# Patient Record
Sex: MALE | Race: WHITE | NOT HISPANIC OR LATINO | URBAN - METROPOLITAN AREA
[De-identification: names, ages, dates, MRNs, and addresses within clinical notes are randomized per-mention and may not be internally consistent; named-entity substitution may affect disease eponyms.]

---

## 2019-10-16 ENCOUNTER — INPATIENT (INPATIENT)
Facility: HOSPITAL | Age: 59
LOS: 4 days | Discharge: HOME | DRG: 871 | End: 2019-10-21
Attending: SURGERY | Admitting: SURGERY
Payer: COMMERCIAL

## 2019-10-16 VITALS — WEIGHT: 240.08 LBS | HEIGHT: 73 IN

## 2019-10-16 DIAGNOSIS — S22.42XA MULTIPLE FRACTURES OF RIBS, LEFT SIDE, INITIAL ENCOUNTER FOR CLOSED FRACTURE: ICD-10-CM

## 2019-10-16 LAB
ALBUMIN SERPL ELPH-MCNC: 3.9 G/DL — SIGNIFICANT CHANGE UP (ref 3.3–5)
ALBUMIN SERPL ELPH-MCNC: 4 G/DL — SIGNIFICANT CHANGE UP (ref 3.3–5)
ALP SERPL-CCNC: 65 U/L — SIGNIFICANT CHANGE UP (ref 40–120)
ALP SERPL-CCNC: 66 U/L — SIGNIFICANT CHANGE UP (ref 40–120)
ALT FLD-CCNC: 26 U/L — SIGNIFICANT CHANGE UP (ref 12–78)
ALT FLD-CCNC: 28 U/L — SIGNIFICANT CHANGE UP (ref 12–78)
ANION GAP SERPL CALC-SCNC: 10 MMOL/L — SIGNIFICANT CHANGE UP (ref 5–17)
ANION GAP SERPL CALC-SCNC: 9 MMOL/L — SIGNIFICANT CHANGE UP (ref 5–17)
APPEARANCE UR: CLEAR — SIGNIFICANT CHANGE UP
APTT BLD: 30.7 SEC — SIGNIFICANT CHANGE UP (ref 27.5–36.3)
AST SERPL-CCNC: 32 U/L — SIGNIFICANT CHANGE UP (ref 15–37)
AST SERPL-CCNC: 34 U/L — SIGNIFICANT CHANGE UP (ref 15–37)
BILIRUB DIRECT SERPL-MCNC: 0.3 MG/DL — HIGH (ref 0–0.2)
BILIRUB INDIRECT FLD-MCNC: 0.7 MG/DL — SIGNIFICANT CHANGE UP (ref 0.2–1)
BILIRUB SERPL-MCNC: 0.8 MG/DL — SIGNIFICANT CHANGE UP (ref 0.2–1.2)
BILIRUB SERPL-MCNC: 1 MG/DL — SIGNIFICANT CHANGE UP (ref 0.2–1.2)
BILIRUB UR-MCNC: NEGATIVE — SIGNIFICANT CHANGE UP
BUN SERPL-MCNC: 6 MG/DL — LOW (ref 7–23)
BUN SERPL-MCNC: 6 MG/DL — LOW (ref 7–23)
CALCIUM SERPL-MCNC: 7.9 MG/DL — LOW (ref 8.5–10.1)
CALCIUM SERPL-MCNC: 8.1 MG/DL — LOW (ref 8.5–10.1)
CHLORIDE SERPL-SCNC: 103 MMOL/L — SIGNIFICANT CHANGE UP (ref 96–108)
CHLORIDE SERPL-SCNC: 104 MMOL/L — SIGNIFICANT CHANGE UP (ref 96–108)
CO2 SERPL-SCNC: 24 MMOL/L — SIGNIFICANT CHANGE UP (ref 22–31)
CO2 SERPL-SCNC: 25 MMOL/L — SIGNIFICANT CHANGE UP (ref 22–31)
COLOR SPEC: YELLOW — SIGNIFICANT CHANGE UP
CREAT SERPL-MCNC: 0.73 MG/DL — SIGNIFICANT CHANGE UP (ref 0.5–1.3)
CREAT SERPL-MCNC: 0.8 MG/DL — SIGNIFICANT CHANGE UP (ref 0.5–1.3)
DIFF PNL FLD: NEGATIVE — SIGNIFICANT CHANGE UP
ETHANOL SERPL-MCNC: 49 MG/DL — HIGH (ref 0–10)
GLUCOSE SERPL-MCNC: 100 MG/DL — HIGH (ref 70–99)
GLUCOSE SERPL-MCNC: 102 MG/DL — HIGH (ref 70–99)
GLUCOSE UR QL: NEGATIVE MG/DL — SIGNIFICANT CHANGE UP
HCT VFR BLD CALC: 47.3 % — SIGNIFICANT CHANGE UP (ref 39–50)
HGB BLD-MCNC: 16.7 G/DL — SIGNIFICANT CHANGE UP (ref 13–17)
INR BLD: 0.97 RATIO — SIGNIFICANT CHANGE UP (ref 0.88–1.16)
KETONES UR-MCNC: ABNORMAL
LEUKOCYTE ESTERASE UR-ACNC: NEGATIVE — SIGNIFICANT CHANGE UP
LIDOCAIN IGE QN: 101 U/L — SIGNIFICANT CHANGE UP (ref 73–393)
MAGNESIUM SERPL-MCNC: 2.1 MG/DL — SIGNIFICANT CHANGE UP (ref 1.6–2.6)
MCHC RBC-ENTMCNC: 34.5 PG — HIGH (ref 27–34)
MCHC RBC-ENTMCNC: 35.3 GM/DL — SIGNIFICANT CHANGE UP (ref 32–36)
MCV RBC AUTO: 97.7 FL — SIGNIFICANT CHANGE UP (ref 80–100)
NITRITE UR-MCNC: NEGATIVE — SIGNIFICANT CHANGE UP
NT-PROBNP SERPL-SCNC: 20 PG/ML — SIGNIFICANT CHANGE UP (ref 0–125)
PH UR: 5 — SIGNIFICANT CHANGE UP (ref 5–8)
PHOSPHATE SERPL-MCNC: 3.1 MG/DL — SIGNIFICANT CHANGE UP (ref 2.5–4.5)
PLATELET # BLD AUTO: 142 K/UL — LOW (ref 150–400)
POTASSIUM SERPL-MCNC: 4 MMOL/L — SIGNIFICANT CHANGE UP (ref 3.5–5.3)
POTASSIUM SERPL-MCNC: 4 MMOL/L — SIGNIFICANT CHANGE UP (ref 3.5–5.3)
POTASSIUM SERPL-SCNC: 4 MMOL/L — SIGNIFICANT CHANGE UP (ref 3.5–5.3)
POTASSIUM SERPL-SCNC: 4 MMOL/L — SIGNIFICANT CHANGE UP (ref 3.5–5.3)
PROT SERPL-MCNC: 7.2 GM/DL — SIGNIFICANT CHANGE UP (ref 6–8.3)
PROT SERPL-MCNC: 7.3 GM/DL — SIGNIFICANT CHANGE UP (ref 6–8.3)
PROT UR-MCNC: NEGATIVE MG/DL — SIGNIFICANT CHANGE UP
PROTHROM AB SERPL-ACNC: 10.8 SEC — SIGNIFICANT CHANGE UP (ref 10–12.9)
RBC # BLD: 4.84 M/UL — SIGNIFICANT CHANGE UP (ref 4.2–5.8)
RBC # FLD: 12.8 % — SIGNIFICANT CHANGE UP (ref 10.3–14.5)
SODIUM SERPL-SCNC: 137 MMOL/L — SIGNIFICANT CHANGE UP (ref 135–145)
SODIUM SERPL-SCNC: 138 MMOL/L — SIGNIFICANT CHANGE UP (ref 135–145)
SP GR SPEC: 1.01 — SIGNIFICANT CHANGE UP (ref 1.01–1.02)
TROPONIN I SERPL-MCNC: <0.015 NG/ML — SIGNIFICANT CHANGE UP (ref 0.01–0.04)
UROBILINOGEN FLD QL: 1 MG/DL
WBC # BLD: 11.23 K/UL — HIGH (ref 3.8–10.5)
WBC # FLD AUTO: 11.23 K/UL — HIGH (ref 3.8–10.5)

## 2019-10-16 PROCEDURE — 83735 ASSAY OF MAGNESIUM: CPT

## 2019-10-16 PROCEDURE — 85025 COMPLETE CBC W/AUTO DIFF WBC: CPT

## 2019-10-16 PROCEDURE — 87070 CULTURE OTHR SPECIMN AEROBIC: CPT

## 2019-10-16 PROCEDURE — 71260 CT THORAX DX C+: CPT | Mod: 26

## 2019-10-16 PROCEDURE — 97162 PT EVAL MOD COMPLEX 30 MIN: CPT | Mod: GP

## 2019-10-16 PROCEDURE — 80076 HEPATIC FUNCTION PANEL: CPT

## 2019-10-16 PROCEDURE — 99232 SBSQ HOSP IP/OBS MODERATE 35: CPT

## 2019-10-16 PROCEDURE — 74177 CT ABD & PELVIS W/CONTRAST: CPT | Mod: 26

## 2019-10-16 PROCEDURE — 87581 M.PNEUMON DNA AMP PROBE: CPT

## 2019-10-16 PROCEDURE — C9113: CPT

## 2019-10-16 PROCEDURE — 99223 1ST HOSP IP/OBS HIGH 75: CPT

## 2019-10-16 PROCEDURE — 93010 ELECTROCARDIOGRAM REPORT: CPT

## 2019-10-16 PROCEDURE — 97110 THERAPEUTIC EXERCISES: CPT | Mod: GP

## 2019-10-16 PROCEDURE — 80048 BASIC METABOLIC PNL TOTAL CA: CPT

## 2019-10-16 PROCEDURE — 84484 ASSAY OF TROPONIN QUANT: CPT

## 2019-10-16 PROCEDURE — 87798 DETECT AGENT NOS DNA AMP: CPT

## 2019-10-16 PROCEDURE — 87040 BLOOD CULTURE FOR BACTERIA: CPT

## 2019-10-16 PROCEDURE — 81003 URINALYSIS AUTO W/O SCOPE: CPT

## 2019-10-16 PROCEDURE — 71046 X-RAY EXAM CHEST 2 VIEWS: CPT | Mod: 26

## 2019-10-16 PROCEDURE — 36415 COLL VENOUS BLD VENIPUNCTURE: CPT

## 2019-10-16 PROCEDURE — 71045 X-RAY EXAM CHEST 1 VIEW: CPT | Mod: 26,59

## 2019-10-16 PROCEDURE — 87486 CHLMYD PNEUM DNA AMP PROBE: CPT

## 2019-10-16 PROCEDURE — 86803 HEPATITIS C AB TEST: CPT

## 2019-10-16 PROCEDURE — 97116 GAIT TRAINING THERAPY: CPT | Mod: GP

## 2019-10-16 PROCEDURE — 71250 CT THORAX DX C-: CPT

## 2019-10-16 PROCEDURE — 85027 COMPLETE CBC AUTOMATED: CPT

## 2019-10-16 PROCEDURE — 71045 X-RAY EXAM CHEST 1 VIEW: CPT

## 2019-10-16 PROCEDURE — 84100 ASSAY OF PHOSPHORUS: CPT

## 2019-10-16 PROCEDURE — 70450 CT HEAD/BRAIN W/O DYE: CPT | Mod: 26

## 2019-10-16 PROCEDURE — 87633 RESP VIRUS 12-25 TARGETS: CPT

## 2019-10-16 PROCEDURE — 72125 CT NECK SPINE W/O DYE: CPT | Mod: 26

## 2019-10-16 RX ORDER — ALBUTEROL 90 UG/1
2 AEROSOL, METERED ORAL
Qty: 0 | Refills: 0 | DISCHARGE

## 2019-10-16 RX ORDER — ACETAMINOPHEN 500 MG
650 TABLET ORAL EVERY 6 HOURS
Refills: 0 | Status: DISCONTINUED | OUTPATIENT
Start: 2019-10-16 | End: 2019-10-21

## 2019-10-16 RX ORDER — SODIUM CHLORIDE 9 MG/ML
1000 INJECTION INTRAMUSCULAR; INTRAVENOUS; SUBCUTANEOUS ONCE
Refills: 0 | Status: COMPLETED | OUTPATIENT
Start: 2019-10-16 | End: 2019-10-16

## 2019-10-16 RX ORDER — PANTOPRAZOLE SODIUM 20 MG/1
40 TABLET, DELAYED RELEASE ORAL DAILY
Refills: 0 | Status: DISCONTINUED | OUTPATIENT
Start: 2019-10-16 | End: 2019-10-18

## 2019-10-16 RX ORDER — HYDROMORPHONE HYDROCHLORIDE 2 MG/ML
1 INJECTION INTRAMUSCULAR; INTRAVENOUS; SUBCUTANEOUS EVERY 4 HOURS
Refills: 0 | Status: DISCONTINUED | OUTPATIENT
Start: 2019-10-16 | End: 2019-10-21

## 2019-10-16 RX ORDER — HYDROMORPHONE HYDROCHLORIDE 2 MG/ML
0.5 INJECTION INTRAMUSCULAR; INTRAVENOUS; SUBCUTANEOUS EVERY 4 HOURS
Refills: 0 | Status: DISCONTINUED | OUTPATIENT
Start: 2019-10-16 | End: 2019-10-21

## 2019-10-16 RX ORDER — HEPARIN SODIUM 5000 [USP'U]/ML
5000 INJECTION INTRAVENOUS; SUBCUTANEOUS EVERY 8 HOURS
Refills: 0 | Status: DISCONTINUED | OUTPATIENT
Start: 2019-10-16 | End: 2019-10-21

## 2019-10-16 RX ORDER — NICOTINE POLACRILEX 2 MG
1 GUM BUCCAL DAILY
Refills: 0 | Status: DISCONTINUED | OUTPATIENT
Start: 2019-10-16 | End: 2019-10-21

## 2019-10-16 RX ORDER — ONDANSETRON 8 MG/1
4 TABLET, FILM COATED ORAL EVERY 6 HOURS
Refills: 0 | Status: DISCONTINUED | OUTPATIENT
Start: 2019-10-16 | End: 2019-10-21

## 2019-10-16 RX ORDER — KETOROLAC TROMETHAMINE 30 MG/ML
30 SYRINGE (ML) INJECTION ONCE
Refills: 0 | Status: DISCONTINUED | OUTPATIENT
Start: 2019-10-16 | End: 2019-10-16

## 2019-10-16 RX ORDER — LIDOCAINE 4 G/100G
2 CREAM TOPICAL DAILY
Refills: 0 | Status: DISCONTINUED | OUTPATIENT
Start: 2019-10-16 | End: 2019-10-21

## 2019-10-16 RX ORDER — SODIUM CHLORIDE 9 MG/ML
1000 INJECTION, SOLUTION INTRAVENOUS
Refills: 0 | Status: DISCONTINUED | OUTPATIENT
Start: 2019-10-16 | End: 2019-10-21

## 2019-10-16 RX ORDER — MORPHINE SULFATE 50 MG/1
4 CAPSULE, EXTENDED RELEASE ORAL ONCE
Refills: 0 | Status: DISCONTINUED | OUTPATIENT
Start: 2019-10-16 | End: 2019-10-16

## 2019-10-16 RX ADMIN — SODIUM CHLORIDE 1000 MILLILITER(S): 9 INJECTION INTRAMUSCULAR; INTRAVENOUS; SUBCUTANEOUS at 11:24

## 2019-10-16 RX ADMIN — MORPHINE SULFATE 4 MILLIGRAM(S): 50 CAPSULE, EXTENDED RELEASE ORAL at 12:11

## 2019-10-16 RX ADMIN — HYDROMORPHONE HYDROCHLORIDE 0.5 MILLIGRAM(S): 2 INJECTION INTRAMUSCULAR; INTRAVENOUS; SUBCUTANEOUS at 20:13

## 2019-10-16 RX ADMIN — HYDROMORPHONE HYDROCHLORIDE 1 MILLIGRAM(S): 2 INJECTION INTRAMUSCULAR; INTRAVENOUS; SUBCUTANEOUS at 17:05

## 2019-10-16 RX ADMIN — Medication 1 PATCH: at 14:44

## 2019-10-16 RX ADMIN — Medication 1 PATCH: at 20:17

## 2019-10-16 RX ADMIN — MORPHINE SULFATE 4 MILLIGRAM(S): 50 CAPSULE, EXTENDED RELEASE ORAL at 12:35

## 2019-10-16 RX ADMIN — SODIUM CHLORIDE 125 MILLILITER(S): 9 INJECTION, SOLUTION INTRAVENOUS at 14:30

## 2019-10-16 RX ADMIN — HYDROMORPHONE HYDROCHLORIDE 0.5 MILLIGRAM(S): 2 INJECTION INTRAMUSCULAR; INTRAVENOUS; SUBCUTANEOUS at 21:00

## 2019-10-16 RX ADMIN — HEPARIN SODIUM 5000 UNIT(S): 5000 INJECTION INTRAVENOUS; SUBCUTANEOUS at 17:05

## 2019-10-16 NOTE — CONSULT NOTE ADULT - SUBJECTIVE AND OBJECTIVE BOX
58 yo M with pmh ETOH abuse, prostate CA, scoliosis presents s/p fall down 6 steps sustaining L 1-2 rib fractures, small L pneumothorax and pneumomediastinum. Hospitalist consulted for medical management      Past Medical, Past Surgical, and Family History:  PAST MEDICAL HISTORY:  Prostate cancer     Scoliosis.     No significant past surgical history.     No pertinent family history in first degree relatives.     No Pertinent Family History in first degree relatives of: none pertinent per pt.     Social History:  Social History (marital status, living situation, occupation, tobacco use, alcohol and drug use, and sexual history): 1 ppd tobacco, pt states 3 cocktails of vodka daily, denied illicit drug use	     Tobacco Screening:  · Core Measure Site	Yes	  · Has the patient used tobacco in the past 30 days?	Yes	  · Tobacco Cessation Education/Counseling	Offered and patient declined	  · Tobacco Cessation Medication	Offered and patient accepted	            REVIEW OF SYSTEMS:    CONSTITUTIONAL: No weakness, fevers or chills  EYES/ENT: No visual changes;  No vertigo or throat pain   NECK: No pain or stiffness  RESPIRATORY: No cough, wheezing, hemoptysis; No shortness of breath  CARDIOVASCULAR: No chest pain or palpitations. _ left chest wall pain  GASTROINTESTINAL: No abdominal or epigastric pain. No nausea, vomiting, or hematemesis; No diarrhea or constipation. No melena or hematochezia.  GENITOURINARY: No dysuria, frequency or hematuria  NEUROLOGICAL: No numbness or weakness  SKIN: No itching, burning, rashes, or lesions   All other review of systems is negative unless indicated above    ICU Vital Signs Last 24 Hrs  T(C): 37.1 (16 Oct 2019 19:02), Max: 37.6 (16 Oct 2019 12:10)  T(F): 98.8 (16 Oct 2019 19:02), Max: 99.7 (16 Oct 2019 16:44)  HR: 79 (16 Oct 2019 19:02) (78 - 94)  BP: 158/75 (16 Oct 2019 19:02) (117/81 - 164/88)  BP(mean): 94 (16 Oct 2019 19:02) (94 - 94)  ABP: --  ABP(mean): --  RR: 24 (16 Oct 2019 19:02) (18 - 28)  SpO2: 92% (16 Oct 2019 19:02) (91% - 100%)        PHYSICAL EXAM:    Constitutional: NAD, awake and alert, well-developed  HEENT: PERR, EOMI, Normal Hearing, MMM  Neck: Soft and supple, No LAD, No JVD  Respiratory: Breath sounds are clear bilaterally, No wheezing, rales or rhonchi  Cardiovascular: S1 and S2, regular rate and rhythm, no Murmurs, gallops or rubs  Gastrointestinal: Bowel Sounds present, soft, nontender, nondistended, no guarding, no rebound  Extremities: No peripheral edema  Vascular: 2+ peripheral pulses  Neurological: A/O x 3, no focal deficits  Musculoskeletal: 5/5 strength b/l upper and lower extremities  Skin: No rashes 58 yo M with pmh ETOH abuse, prostate CA, scoliosis presents s/p fall down 6 steps sustaining L 1-2 rib fractures, small L pneumothorax and pneumomediastinum.   Hospitalist consulted for medical management.   Pt appears uncomfortable during exam endorsing pain to left anterior chest wall worse on inspiration. No abd pain. No neck pain.   No hematochezia or melena. No hematuria.       Past Medical, Past Surgical, and Family History:  PAST MEDICAL HISTORY:  Prostate cancer     Scoliosis.     Shx: left foot surgery after complete avulsion      fhx: mother early CAD,  at 92; Father: copd     Social History:  Social History (marital status, living situation, occupation, tobacco use, alcohol and drug use, and sexual history): 1 ppd tobacco x 30 years, pt states 3 cocktails of vodka weekly, denied illicit drug use	     Tobacco Screening:  · Core Measure Site	Yes	  · Has the patient used tobacco in the past 30 days?	Yes	  · Tobacco Cessation Education/Counseling	Offered and patient declined	  · Tobacco Cessation Medication	Offered and patient accepted	            REVIEW OF SYSTEMS:    CONSTITUTIONAL: No weakness, fevers or chills  EYES/ENT: No visual changes;  No vertigo or throat pain   NECK: No pain or stiffness  RESPIRATORY: No cough, wheezing, hemoptysis; No shortness of breath  CARDIOVASCULAR: No chest pain or palpitations. + left chest wall pain  GASTROINTESTINAL: No abdominal or epigastric pain. No nausea, vomiting, or hematemesis; No diarrhea or constipation. No melena or hematochezia.  GENITOURINARY: No dysuria, frequency or hematuria  NEUROLOGICAL: No numbness or weakness  SKIN: No itching, burning, rashes, or lesions   All other review of systems is negative unless indicated above    ICU Vital Signs Last 24 Hrs  T(C): 37.1 (16 Oct 2019 19:02), Max: 37.6 (16 Oct 2019 12:10)  T(F): 98.8 (16 Oct 2019 19:02), Max: 99.7 (16 Oct 2019 16:44)  HR: 79 (16 Oct 2019 19:02) (78 - 94)  BP: 158/75 (16 Oct 2019 19:02) (117/81 - 164/88)  BP(mean): 94 (16 Oct 2019 19:02) (94 - 94)  ABP: --  ABP(mean): --  RR: 24 (16 Oct 2019 19:02) (18 - 28)  SpO2: 92% (16 Oct 2019 19:02) (91% - 100%)        PHYSICAL EXAM:    Constitutional: NAD, awake and alert, well-developed  HEENT: PERR, EOMI, Normal Hearing, MMM  Neck: Soft and supple, No LAD, No JVD  Respiratory: Breath sounds are clear bilaterally, No wheezing, rales or rhonchi  Cardiovascular: S1 and S2, regular rate and rhythm, no Murmurs, gallops or rubs  Gastrointestinal: Bowel Sounds present, soft, nontender, nondistended, no guarding, no rebound  Extremities: No peripheral edema  Vascular: 2+ peripheral pulses  Neurological: A/O x 3, no focal deficits  Musculoskeletal: 5/5 strength b/l upper and lower extremities  Skin: No rashes

## 2019-10-16 NOTE — ED STATDOCS - CARE PLAN
Principal Discharge DX:	Closed fracture of multiple ribs of left side, initial encounter  Secondary Diagnosis:	Pneumomediastinum  Secondary Diagnosis:	Subcutaneous air, initial encounter

## 2019-10-16 NOTE — ED STATDOCS - CLINICAL SUMMARY MEDICAL DECISION MAKING FREE TEXT BOX
Pt with CP s/p fall down stairs. +EtOH. Concern for traumatic injury, ACS. EKG unchanged vs 2015. Plan for labs, imaging.

## 2019-10-16 NOTE — CONSULT NOTE ADULT - ASSESSMENT
59M with h/o smoking, ETOH abuse, s/p fall down 5 stairs last night with new left 1-2 rib fractures, pneumothorax and pneumomediastium.    *  Repeat CXR in 2 hours  *  HOB 30  *  Aspiration precautions  *  CIWA protocol  *  Lidoderm for pain control

## 2019-10-16 NOTE — ED STATDOCS - PROGRESS NOTE DETAILS
called by radiologist with pt's CT results, pt with L anterior 1st and 2nd rib fxs, small L pneumothorax, marked pneumomediastinum and subcutaneous air in the L chest wall, called and spoke with trauma surgeon Dr. Aldridge, will come see pt, also requested we call thoracic surgery, paged, awaiting callback, pt sent to main ED trauma room  Flora Pham PA-C spoke with thoracic PA, will come see pt  Flora Pham PA-C pt seen and examined by Dr. Emanuel, will admit pt to him for further management  Flora Pham PA-C

## 2019-10-16 NOTE — ED ADULT NURSE NOTE - NSIMPLEMENTINTERV_GEN_ALL_ED
Implemented All Universal Safety Interventions:  [place] to call system. Call bell, personal items and telephone within reach. Instruct patient to call for assistance. Room bathroom lighting operational. Non-slip footwear when patient is off stretcher. Physically safe environment: no spills, clutter or unnecessary equipment. Stretcher in lowest position, wheels locked, appropriate side rails in place.

## 2019-10-16 NOTE — CONSULT NOTE ADULT - SUBJECTIVE AND OBJECTIVE BOX
History of Present Illness:  59y Male who presented this morning with pain s/p fall 12 hours ago. +ETOH, fall down 5 steps, denies LOC.  CT found to have left 1-2 rib fractures with small left pneumothorax, pneumomediastinum.  Denies nausea, vomiting.    PMH/PSH:  Prostate cancer  Scoliosis        SOCIAL HISTORY:  Smoker: [X] Yes  [ ] No        PACK YEARS:                         WHEN QUIT?  ETOH use: [X] Yes  [ ] No              FREQUENCY / QUANTITY:  [place] Drug use:  [ ] Yes  [X] No  Occupation:    Live with:  Boyfriend/partner  Assist device use: None    MEDICATIONS  (STANDING):  heparin  Injectable 5000 Unit(s) SubCutaneous every 8 hours  nicotine -  14 mG/24Hr(s) Patch 1 patch Transdermal daily  pantoprazole  Injectable 40 milliGRAM(s) IV Push daily  sodium chloride 0.9% 1000 milliLiter(s) (125 mL/Hr) IV Continuous <Continuous>    MEDICATIONS  (PRN):  acetaminophen   Tablet .. 650 milliGRAM(s) Oral every 6 hours PRN Temp greater or equal to 38C (100.4F), Mild Pain (1 - 3)  HYDROmorphone  Injectable 0.5 milliGRAM(s) IV Push every 4 hours PRN Moderate Pain (4 - 6)  HYDROmorphone  Injectable 1 milliGRAM(s) IV Push every 4 hours PRN Severe Pain (7 - 10)  LORazepam   Injectable 2 milliGRAM(s) IV Push every 2 hours PRN CIWA-Ar score increase by 2 points and a total score of 7 or less  ondansetron Injectable 4 milliGRAM(s) IV Push every 6 hours PRN Nausea      Allergies: No Known Allergies                                                            LABS:                        16.7   11.23 )-----------( 142      ( 16 Oct 2019 11:17 )             47.3     10-16    137  |  103  |  6<L>  ----------------------------<  100<H>  4.0   |  24  |  0.80    Ca    8.1<L>      16 Oct 2019 11:17    TPro  7.3  /  Alb  4.0  /  TBili  0.8  /  DBili  x   /  AST  34  /  ALT  28  /  AlkPhos  66  10-16    PT/INR - ( 16 Oct 2019 11:17 )   PT: 10.8 sec;   INR: 0.97 ratio         PTT - ( 16 Oct 2019 11:17 )  PTT:30.7 sec      Review of Systems       Constitutional: denies fever, chills, general malaise, weight loss, weight gain, diaphoresis   HEENT: denies dry mouth, sore throat, runny nose, photophobia, blurry vision, double vision, glasses, discharge, eye pain, difficulty hearing, vertigo, dysphagia, epistaxis, recent dental work    Respiratory: denies SOB, PERKINS, cough, phlegm, wheezing, hemoptysis  Cardiovascular: denies CP, palpitations, edema, diaphoresis   Gastrointestinal: denies nausea, vomiting, diarrhea, constipation, abdominal pain, melena   Genitourinary: denies dysuria, frequency, urgency, incontinence, hematuria   Skin/Breast: denies rash, hives, itching, masses, hair loss   Musculoskeletal: denies myalgias, arthritis, joint swelling, muscle weakness  Neurologic: denies syncope, LOC, headache, weakness, dizziness, parasthesias, numbness, seizures, confusion, dementia   Psychiatric: denies feeling anxious, depressed, suicidal, homicidal  Endocrine: denies increased fingerstick glucoses, cold or heat intolerance, polydipsia, polyuria, polyphagia   Hematology/Oncology: denies bruising, tender or enlarged lymph nodes   ROS negative x 10 systems except as noted above    T(C): 37.6   HR: 86  BP: 149/79   RR: 20   SpO2: 91% on room air    Physical Exam  General: WD, WN, NAD                                                         Neuro: A+O x 3, non-focal, speech clear and intact                     Eyes: PERRL, EOMI   ENT: Normal exam of nasal/oral mucosa with absence of cyanosis.   Neck: supple, no JVD, trachea midline   Chest: CTA, equal bilaterally, no wheezes/rales/rhonchi, normal excursion, no accessory muscle use note; left chest crepitus anteriorly with TTP over the left anterior chest  CV: RRR, +S1S2  GI: soft, NT, ND, +BS, no organomegaly noted  Extremities: THRASHER x 4, no C/C/E, distal motor/neuro/circ intact  SKIN: warm, dry, intact

## 2019-10-16 NOTE — CONSULT NOTE ADULT - ASSESSMENT
60 yo M with pmh ETOH abuse, prostate CA, scoliosis presents s/p fall down 6 steps sustaining L 1-2 rib fractures, small L pneumothorax and pneumomediastinum. Hospitalist consulted for medical management    #L multiple rib fractures after mechanical fall  #L PTX - post traumatic  #Pneumomediastinum - post traumatic  #ETOH use 58 yo M with pmh ETOH abuse, prostate CA, scoliosis presents s/p fall down 6 steps sustaining L 1-2 rib fractures, small L pneumothorax and pneumomediastinum. Hospitalist consulted for medical management    #L multiple rib fractures after mechanical fall  #L PTX - post traumatic  #Pneumomediastinum - post traumatic  #ETOH use  - Pain control  - Incentive spiromater  - Continuous pulse ox  - Serial CXR for evolution of PTX  - No e/o withdrawal  - Cont CIWA protocol  - EKG noted, no dynamic changes    DVT px

## 2019-10-16 NOTE — ED ADULT NURSE REASSESSMENT NOTE - NS ED NURSE REASSESS COMMENT FT1
Dr. Emanuel at the bedside evaluating patient. Pt sating 91% RA, pt placed on 2L NC , now sating 94%. Awaiting for thoracic consult. Will continue to monitor for safety and comfort.

## 2019-10-16 NOTE — ED STATDOCS - MUSCULOSKELETAL, MLM
range of motion is not limited. No midline spinal tenderness. No midline tenderness to Cspine. No palpable step off. Tenderness to anterior chest. No ecchymosis.

## 2019-10-16 NOTE — CONSULT NOTE ADULT - ATTENDING COMMENTS
Patient seen and examined. CT chest reviewed. I was clearly able to see the second rib fx, but the first rib fx was difficult to see. The major vessels seem intact. Usually it takes a great deal of impact to break the first two ribs. He does have extensive subcutaneous air. He denies any difficulty breathing, he is able to swallow spit without any problems. His voice does sounds a bit nasally due to the subq air.     The rib fractures will heal conservatively. Will monitor for delayed or subsequent pleural effusion, hemothorax, pneumothorax. We will also make sure that the pneumomediastinum is improving.     Thank You for the consult, will continue to follow along with you.

## 2019-10-16 NOTE — ED ADULT NURSE REASSESSMENT NOTE - NS ED NURSE REASSESS COMMENT FT1
Eliza MILLER  from Thoracic Surgery at the bedside evaluating patient at this time, will continue to monitor for safety and comfort. Pt explained the CT results, free air noted im the left chest wall and broken ribs. Plan is to admit for observation, repeat x-rays to watch for increase in free air in chest cavity.

## 2019-10-16 NOTE — H&P ADULT - HISTORY OF PRESENT ILLNESS
Trauma Consult, 10/06/19, notified 1146am, responded bedside 1200pm    Pt s/p fall down 6 steps in evening 10/15/19, pt denied head trauma or LOC. Pt stated going to sleep after the fall, with c/o left rib/chest pain; pt woke up this am, 10/16/19, c/o left rib/chest pain and presented to ER for evaluation    Pt seen and examined at bedside with chaperone. Pt is AAOx3, pt in no acute distress. Pt denied c/o fever, chills, SOB, abd pain, N/V/D, extremity pain or dysfunction, hemoptysis, hematemesis, hematuria, hematochexia, headache, diplopia, vertigo, dizzyness.

## 2019-10-16 NOTE — H&P ADULT - ASSESSMENT
A/P:  Left 1-2nd rib fractures  Large left subcutaneous emphysema  Pneumomediastinum  S/P fall down 6 steps 10/15/19  Thoracic surgery on consult, management and further workup per thoracic surgery  CIWA protocol  NPO, IV hydration  GI/DVT prophylaxis  Pain control  Incentive spirometry  F/U labs, radiologic studies  Hospitalist consult for medical management   consult for etoh  Physical therapy  Pt will be monitored for signs of evolution/resolution of pathology and surgical intervention as required and warranted  Pt aware of and agrees with all of the above

## 2019-10-16 NOTE — ED ADULT NURSE NOTE - OBJECTIVE STATEMENT
pt is a 58 y/o male smoker greater than 20 years presenting to ED for eval of chest pain, anterior chest since this morning w/ associated productive cough. denies fever chills palpitations nausea vomiting or diarrhea. pt denies recent travel.

## 2019-10-16 NOTE — H&P ADULT - NSHPPHYSICALEXAM_GEN_ALL_CORE
GCS of 15  Airway is patent  Breathing is symmetric and unlabored  Neuro: CNII-XII grossly intact  Psych: normal affect  HEENT: Normocephalic, atraumatic, RODGER, EOM wnl, no otorrhea or hemotympanum b/l, no epistaxis or d/c b/l nares, no craniofacial bony pathology or tenderness b/l  Neck: soft nad supple, non tender to passive/active ROM exam. (+) crepitus, no ecchymosis, no hematoma, to exam, no JVD, no tracheal deviation  Cspine/thoracolumbrosacral spine: no gross bony pathology or tenderness to exam  Cardiovascular: S1S2 Present  Chest: no gross right rib pathology or tenderness to exam. No sternal pathology or tenderness to exam. (+) tenderness to left upper thorax posteriorly and anteriorly. (+) crepitus to left chest wall region, no ecchymosis, no hematoma. No penetrating thorcoabdominal trauma  Respiratory: Rate is 18; Respiratory Effort normal; no wheezes, rales or rhonchi to exam  ABD: bowel sounds (+), soft, nontender, non distended, no rebound, no guarding, no rigidity, no skin changes to exam. No pelvic instability to exam, no skin changes  Genitourinary: No scrotal/perineal/perirectal hematoma/ecchymosis/tenderness to exam  External genitalia: normal, no blood at urethral meatus  Musculoskeletal: Pt has palpable b/l radial, femoral, dorsalis pedis pulses. All digits are warm and well perfused. No gross long bone pathology or tenderness to exam. Pt demonstrates grossly intact sensoromotor function. Pt has good capillary refill to digits, no calf edema or tenderness to exam.  Skin: no lesions or rashes to exam    Vital Signs Last 24 Hrs, see trauma flow sheet  T(C): 37.6 (16 Oct 2019 12:10), Max: 37.6 (16 Oct 2019 12:10)  T(F): 99.6 (16 Oct 2019 12:10), Max: 99.6 (16 Oct 2019 12:10)  HR: 86 (16 Oct 2019 12:10) (86 - 94)  BP: 149/79 (16 Oct 2019 12:10) (133/72 - 149/79)  BP(mean): --  RR: 20 (16 Oct 2019 12:10) (18 - 20)  SpO2: 91% (16 Oct 2019 12:10) (91% - 100%)

## 2019-10-16 NOTE — ED ADULT NURSE NOTE - CHPI ED NUR SYMPTOMS NEG
no chills/no dizziness/no congestion/no diaphoresis/no syncope/no fever/no nausea/no shortness of breath/no vomiting

## 2019-10-16 NOTE — H&P ADULT - NSHPLABSRESULTS_GEN_ALL_CORE
Labs:  CARDIAC MARKERS ( 16 Oct 2019 11:17 )  <0.015 ng/mL / x     / x     / x     / x                            16.7   11.23 )-----------( 142      ( 16 Oct 2019 11:17 )             47.3   CBC Full  -  ( 16 Oct 2019 11:17 )  WBC Count : 11.23 K/uL  RBC Count : 4.84 M/uL  Hemoglobin : 16.7 g/dL  Hematocrit : 47.3 %  Platelet Count - Automated : 142 K/uL  Mean Cell Volume : 97.7 fl  Mean Cell Hemoglobin : 34.5 pg  Mean Cell Hemoglobin Concentration : 35.3 gm/dL  Auto Neutrophil # : x  Auto Lymphocyte # : x  Auto Monocyte # : x  Auto Eosinophil # : x  Auto Basophil # : x  Auto Neutrophil % : x  Auto Lymphocyte % : x  Auto Monocyte % : x  Auto Eosinophil % : x  Auto Basophil % : x  137  |  103  |  6<L>  ----------------------------<  100<H>  4.0   |  24  |  0.80  Ca    8.1<L>      16 Oct 2019 11:17  TPro  7.3  /  Alb  4.0  /  TBili  0.8  /  DBili  x   /  AST  34  /  ALT  28  /  AlkPhos  66  10-16  LIVER FUNCTIONS - ( 16 Oct 2019 11:17 )  Alb: 4.0 g/dL / Pro: 7.3 gm/dL / ALK PHOS: 66 U/L / ALT: 28 U/L / AST: 34 U/L / GGT: x         PT/INR - ( 16 Oct 2019 11:17 )   PT: 10.8 sec;   INR: 0.97 ratio    PTT - ( 16 Oct 2019 11:17 )  PTT:30.7 sec    Radiology:    EXAM:  CT ABDOMEN AND PELVIS IC                        EXAM:  CT CHEST IC                        PROCEDURE DATE:  10/16/2019    INTERPRETATION:  CLINICAL INFORMATION: Chest pain, status post fall down   stairs  COMPARISON: None.  IMPRESSION:   Fractures of the left anterior first and second ribs.  Small left pneumothorax  Marked pneumomediastinum  Marked neck and left chest wall subcutaneous emphysema.    EXAM:  CT CERVICAL SPINE                        PROCEDURE DATE:  10/16/2019    INTERPRETATION:  Exam Date: 10/16/2019 11:05 AM  CT cervical spine without IV contrast  CLINICAL INFORMATION: pain s/p fall  IMPRESSION:     Very large degree of emphysema throughout the deep soft tissues of the   neck and upper mediastinum. Please see report of CT chest for further   thoracic findings.    No vertebral fracture is recognized.  Multilevel degenerative changes of   the cervical spine are present.    EXAM:  CT BRAIN                        PROCEDURE DATE:  10/16/2019    INTERPRETATION:  Exam Date: 10/16/2019 10:59 AM  IMPRESSION:     No acute intracranial findings.  There is a chronic lacunar infarct in   the left anterior basal ganglia.

## 2019-10-16 NOTE — ED STATDOCS - OBJECTIVE STATEMENT
60 y/o male with a PMHx of prostate cancer, scoliosis presents to the ED c/o chest tightness starting this morning. Nonradiating. Pt notes he was wearing socks last night and fell down 5 steps. No LOC. Pt landed on his back and is now c/o back pain. Denies dysuria, hematuria, abd pain. Family cardiac hx. Smoker (1ppd). Last EtOH: 12am last night. NKDA. No recent travel. Does not have a PCP. No other complaints at this time.

## 2019-10-16 NOTE — ED STATDOCS - NS_ ATTENDINGSCRIBEDETAILS _ED_A_ED_FT
Mukesh Matias DO (Attending): The history, relevant review of systems, past medical and surgical history, medical decision making, and physical examination was documented by the scribe in my presence and I attest to the accuracy of the documentation.

## 2019-10-17 LAB
BASOPHILS # BLD AUTO: 0.04 K/UL — SIGNIFICANT CHANGE UP (ref 0–0.2)
BASOPHILS NFR BLD AUTO: 0.4 % — SIGNIFICANT CHANGE UP (ref 0–2)
EOSINOPHIL # BLD AUTO: 0.01 K/UL — SIGNIFICANT CHANGE UP (ref 0–0.5)
EOSINOPHIL NFR BLD AUTO: 0.1 % — SIGNIFICANT CHANGE UP (ref 0–6)
GRAM STN FLD: SIGNIFICANT CHANGE UP
HCT VFR BLD CALC: 47.1 % — SIGNIFICANT CHANGE UP (ref 39–50)
HCV AB S/CO SERPL IA: 0.16 S/CO — SIGNIFICANT CHANGE UP (ref 0–0.99)
HCV AB SERPL-IMP: SIGNIFICANT CHANGE UP
HGB BLD-MCNC: 15.8 G/DL — SIGNIFICANT CHANGE UP (ref 13–17)
IMM GRANULOCYTES NFR BLD AUTO: 0.4 % — SIGNIFICANT CHANGE UP (ref 0–1.5)
LYMPHOCYTES # BLD AUTO: 1.25 K/UL — SIGNIFICANT CHANGE UP (ref 1–3.3)
LYMPHOCYTES # BLD AUTO: 11 % — LOW (ref 13–44)
MAGNESIUM SERPL-MCNC: 2 MG/DL — SIGNIFICANT CHANGE UP (ref 1.6–2.6)
MCHC RBC-ENTMCNC: 33.5 GM/DL — SIGNIFICANT CHANGE UP (ref 32–36)
MCHC RBC-ENTMCNC: 33.5 PG — SIGNIFICANT CHANGE UP (ref 27–34)
MCV RBC AUTO: 99.8 FL — SIGNIFICANT CHANGE UP (ref 80–100)
MONOCYTES # BLD AUTO: 0.79 K/UL — SIGNIFICANT CHANGE UP (ref 0–0.9)
MONOCYTES NFR BLD AUTO: 6.9 % — SIGNIFICANT CHANGE UP (ref 2–14)
NEUTROPHILS # BLD AUTO: 9.24 K/UL — HIGH (ref 1.8–7.4)
NEUTROPHILS NFR BLD AUTO: 81.2 % — HIGH (ref 43–77)
PHOSPHATE SERPL-MCNC: 2 MG/DL — LOW (ref 2.5–4.5)
PLATELET # BLD AUTO: 121 K/UL — LOW (ref 150–400)
RAPID RVP RESULT: SIGNIFICANT CHANGE UP
RBC # BLD: 4.72 M/UL — SIGNIFICANT CHANGE UP (ref 4.2–5.8)
RBC # FLD: 12.8 % — SIGNIFICANT CHANGE UP (ref 10.3–14.5)
SPECIMEN SOURCE: SIGNIFICANT CHANGE UP
WBC # BLD: 11.38 K/UL — HIGH (ref 3.8–10.5)
WBC # FLD AUTO: 11.38 K/UL — HIGH (ref 3.8–10.5)

## 2019-10-17 PROCEDURE — 99232 SBSQ HOSP IP/OBS MODERATE 35: CPT

## 2019-10-17 PROCEDURE — 71045 X-RAY EXAM CHEST 1 VIEW: CPT | Mod: 26

## 2019-10-17 PROCEDURE — 99233 SBSQ HOSP IP/OBS HIGH 50: CPT

## 2019-10-17 RX ORDER — AZITHROMYCIN 500 MG/1
500 TABLET, FILM COATED ORAL EVERY 24 HOURS
Refills: 0 | Status: DISCONTINUED | OUTPATIENT
Start: 2019-10-18 | End: 2019-10-21

## 2019-10-17 RX ORDER — CEFTRIAXONE 500 MG/1
INJECTION, POWDER, FOR SOLUTION INTRAMUSCULAR; INTRAVENOUS
Refills: 0 | Status: DISCONTINUED | OUTPATIENT
Start: 2019-10-17 | End: 2019-10-21

## 2019-10-17 RX ORDER — CEFTRIAXONE 500 MG/1
1000 INJECTION, POWDER, FOR SOLUTION INTRAMUSCULAR; INTRAVENOUS ONCE
Refills: 0 | Status: COMPLETED | OUTPATIENT
Start: 2019-10-17 | End: 2019-10-17

## 2019-10-17 RX ORDER — AZITHROMYCIN 500 MG/1
500 TABLET, FILM COATED ORAL ONCE
Refills: 0 | Status: COMPLETED | OUTPATIENT
Start: 2019-10-17 | End: 2019-10-17

## 2019-10-17 RX ORDER — CEFTRIAXONE 500 MG/1
1000 INJECTION, POWDER, FOR SOLUTION INTRAMUSCULAR; INTRAVENOUS EVERY 24 HOURS
Refills: 0 | Status: DISCONTINUED | OUTPATIENT
Start: 2019-10-18 | End: 2019-10-21

## 2019-10-17 RX ORDER — SODIUM,POTASSIUM PHOSPHATES 278-250MG
1 POWDER IN PACKET (EA) ORAL ONCE
Refills: 0 | Status: COMPLETED | OUTPATIENT
Start: 2019-10-17 | End: 2019-10-17

## 2019-10-17 RX ORDER — AZITHROMYCIN 500 MG/1
TABLET, FILM COATED ORAL
Refills: 0 | Status: DISCONTINUED | OUTPATIENT
Start: 2019-10-17 | End: 2019-10-21

## 2019-10-17 RX ADMIN — HYDROMORPHONE HYDROCHLORIDE 1 MILLIGRAM(S): 2 INJECTION INTRAMUSCULAR; INTRAVENOUS; SUBCUTANEOUS at 01:05

## 2019-10-17 RX ADMIN — HEPARIN SODIUM 5000 UNIT(S): 5000 INJECTION INTRAVENOUS; SUBCUTANEOUS at 21:44

## 2019-10-17 RX ADMIN — Medication 600 MILLIGRAM(S): at 17:32

## 2019-10-17 RX ADMIN — PANTOPRAZOLE SODIUM 40 MILLIGRAM(S): 20 TABLET, DELAYED RELEASE ORAL at 12:53

## 2019-10-17 RX ADMIN — LIDOCAINE 2 PATCH: 4 CREAM TOPICAL at 19:00

## 2019-10-17 RX ADMIN — Medication 650 MILLIGRAM(S): at 09:00

## 2019-10-17 RX ADMIN — Medication 1 PACKET(S): at 12:52

## 2019-10-17 RX ADMIN — Medication 2 MILLIGRAM(S): at 04:21

## 2019-10-17 RX ADMIN — AZITHROMYCIN 255 MILLIGRAM(S): 500 TABLET, FILM COATED ORAL at 14:18

## 2019-10-17 RX ADMIN — Medication 650 MILLIGRAM(S): at 20:13

## 2019-10-17 RX ADMIN — Medication 1 PATCH: at 13:00

## 2019-10-17 RX ADMIN — HEPARIN SODIUM 5000 UNIT(S): 5000 INJECTION INTRAVENOUS; SUBCUTANEOUS at 04:21

## 2019-10-17 RX ADMIN — HEPARIN SODIUM 5000 UNIT(S): 5000 INJECTION INTRAVENOUS; SUBCUTANEOUS at 14:25

## 2019-10-17 RX ADMIN — HYDROMORPHONE HYDROCHLORIDE 1 MILLIGRAM(S): 2 INJECTION INTRAMUSCULAR; INTRAVENOUS; SUBCUTANEOUS at 00:22

## 2019-10-17 RX ADMIN — Medication 1 PATCH: at 19:00

## 2019-10-17 RX ADMIN — Medication 650 MILLIGRAM(S): at 15:00

## 2019-10-17 RX ADMIN — CEFTRIAXONE 1000 MILLIGRAM(S): 500 INJECTION, POWDER, FOR SOLUTION INTRAMUSCULAR; INTRAVENOUS at 12:51

## 2019-10-17 RX ADMIN — HYDROMORPHONE HYDROCHLORIDE 1 MILLIGRAM(S): 2 INJECTION INTRAMUSCULAR; INTRAVENOUS; SUBCUTANEOUS at 13:15

## 2019-10-17 RX ADMIN — LIDOCAINE 2 PATCH: 4 CREAM TOPICAL at 12:49

## 2019-10-17 RX ADMIN — HYDROMORPHONE HYDROCHLORIDE 1 MILLIGRAM(S): 2 INJECTION INTRAMUSCULAR; INTRAVENOUS; SUBCUTANEOUS at 12:48

## 2019-10-17 RX ADMIN — Medication 1 PATCH: at 12:52

## 2019-10-17 NOTE — CONSULT NOTE ADULT - ASSESSMENT
58 yo M with pmh ETOH abuse, prostate CA, scoliosis presents s/p fall down 6 steps sustaining L 1-2 rib fractures, small L pneumothorax and pneumomediastinum. Hospitalist consulted for medical management    #L multiple rib fractures after mechanical fall  #L PTX - post traumatic  #Pneumomediastinum - post traumatic  #ETOH use  - Pain control  - Incentive spirometer  - Continuous pulse ox  - Serial CXR for evolution of PTX  - No e/o withdrawal  - Cont CIWA protocol  - EKG noted, no dynamic changes    #Sepsis/CAP  - POA  - pt had constitutional symptoms prior to arrival  - CXR noted  - Collect blood and sputum cultures  - RVP  - Start rocephin and zithro for suspected GN rods pna  - tyelnol prn  - anti-tussive/mucolytics   -serial cxr      DVT px  d/w nursing

## 2019-10-17 NOTE — PHYSICAL THERAPY INITIAL EVALUATION ADULT - GENERAL OBSERVATIONS, REHAB EVAL
pt rec'd sitting in BS chair in SDU, O2, monitors, SCDs in place. Pt w/ coughing t/o encounter, yellowish mucus, very productive -d/w nsg and then MD. + fever per nsg.

## 2019-10-17 NOTE — PHYSICAL THERAPY INITIAL EVALUATION ADULT - PERTINENT HX OF CURRENT PROBLEM, REHAB EVAL
s/p fall  +ETOH, fall down 5 steps found to have left 1-2 rib fractures with small left pneumothorax, pneumomediastinum, Subcut air. CXR this am shows no signif Ptx per PA note.

## 2019-10-17 NOTE — PROGRESS NOTE ADULT - SUBJECTIVE AND OBJECTIVE BOX
CC: Patient is a 59y old  Male who presents with a chief complaint of rib fractures (17 Oct 2019 11:24)      Subjective:  tolerating clears  no nausea or vomiting  last BM was Tues 10/15  no dypnea  left-sided chest wall pain controlled with Dilaudid IV      Physical Exam:  Vital Signs Last 24 Hrs  T(C): 38.3 (17 Oct 2019 17:26), Max: 38.8 (17 Oct 2019 08:19)  T(F): 101 (17 Oct 2019 17:26), Max: 101.9 (17 Oct 2019 14:31)  HR: 89 (17 Oct 2019 18:00) (78 - 101)  BP: 121/62 (17 Oct 2019 18:00) (121/62 - 185/85)  BP(mean): 74 (17 Oct 2019 18:00) (74 - 111)  RR: 28 (17 Oct 2019 18:00) (23 - 33)  SpO2: 94% (17 Oct 2019 18:00) (91% - 96%)    SpO2 = 95% on 4 lpm via nasal cannula  appears comfortable, sitting in chair  right lung clear  decreased breath sounds in left lung  subcutaneous emphysema on left chest wall  soft / NT / ND      Labs:                        15.8   11.38 )-----------( 121      ( 17 Oct 2019 06:32 )             47.1     PT/INR - ( 16 Oct 2019 11:17 )   PT: 10.8 sec;   INR: 0.97 ratio         PTT - ( 16 Oct 2019 11:17 )  PTT:30.7 sec  10-17    138  |  104  |  9   ----------------------------<  102<H>  3.5   |  26  |  0.69    Ca    8.0<L>      17 Oct 2019 06:32  Phos  2.0     10-17  Mg     2.0     10-17    TPro  7.2  /  Alb  3.9  /  TBili  1.0  /  DBili  0.3<H>  /  AST  32  /  ALT  26  /  AlkPhos  65  10-16    LIVER FUNCTIONS - ( 16 Oct 2019 13:26 )  Alb: 3.9 g/dL / Pro: 7.2 gm/dL / ALK PHOS: 65 U/L / ALT: 26 U/L / AST: 32 U/L / GGT: x                 Microbiology:      Radiology:  CXR (today) - difficult to evaluate for left hemothorax since he is rotated with the heart obscuring the left costophrenic angle. no left pneumothorax, but subcutaneous emphysema persists.      Meds:  MEDICATIONS  (STANDING):  azithromycin  IVPB      cefTRIAXone Injectable.      guaiFENesin  milliGRAM(s) Oral every 12 hours  heparin  Injectable 5000 Unit(s) SubCutaneous every 8 hours  lidocaine   Patch 2 Patch Transdermal daily  nicotine -  14 mG/24Hr(s) Patch 1 patch Transdermal daily  pantoprazole  Injectable 40 milliGRAM(s) IV Push daily  sodium chloride 0.9% 1000 milliLiter(s) (125 mL/Hr) IV Continuous <Continuous>    MEDICATIONS  (PRN):  acetaminophen   Tablet .. 650 milliGRAM(s) Oral every 6 hours PRN Temp greater or equal to 38C (100.4F), Mild Pain (1 - 3)  HYDROmorphone  Injectable 0.5 milliGRAM(s) IV Push every 4 hours PRN Moderate Pain (4 - 6)  HYDROmorphone  Injectable 1 milliGRAM(s) IV Push every 4 hours PRN Severe Pain (7 - 10)  LORazepam   Injectable 2 milliGRAM(s) IV Push every 2 hours PRN CIWA-Ar score increase by 2 points and a total score of 7 or less  ondansetron Injectable 4 milliGRAM(s) IV Push every 6 hours PRN Nausea

## 2019-10-17 NOTE — PROGRESS NOTE ADULT - ASSESSMENT
left 1st costochondral dissociation  left 2nd anterior displace rib fractures  -pain control    trace left traumatic pneumothorax with small amount of pneumomediastinum and large amount of subcutaneous emphysema  small left traumatic hemothorax  -no need for pleural drainage at this time  -will attempt bedside left pleural U/S, but quality will likely be limited by subcutaneous emphysema left 1st costochondral separation  left 2nd anterior displace rib fractures  -pain control    trace left traumatic pneumothorax with small amount of pneumomediastinum and large amount of subcutaneous emphysema  small left traumatic hemothorax  -no need for pleural drainage at this time  -will attempt bedside left pleural U/S, but quality will likely be limited by subcutaneous emphysema left 1st costochondral separation  left 2nd anterior displace rib fractures  -pain control    trace left traumatic pneumothorax with small amount of pneumomediastinum and large amount of subcutaneous emphysema  small left traumatic hemothorax  -no need for pleural drainage at this time  -bedside left pleural U/S performed. no hemothorax was seen

## 2019-10-17 NOTE — PHYSICAL THERAPY INITIAL EVALUATION ADULT - ACTIVE RANGE OF MOTION EXAMINATION, REHAB EVAL
except L shld elev ~90 due to L sided rib pain/bilateral upper extremity Active ROM was WFL (within functional limits)/bilateral  lower extremity Active ROM was WFL (within functional limits)

## 2019-10-17 NOTE — PROGRESS NOTE ADULT - SUBJECTIVE AND OBJECTIVE BOX
Subjective:  59y Male admitted 10/16 s/p fall  +ETOH, fall down 5 steps found to have left 1-2 rib fractures with small left pneumothorax, pneumomediastinum.   10/17/19 Pt seen, c/o back discomfort in bed. Currently w temp of 101.8. Denies difficultyl breathing/swallowing, chest pain with swallowing.    Vital Signs:  Vital Signs Last 24 Hrs  T(C): 36.8 (10-17-19 @ 04:00), Max: 37.6 (10-16-19 @ 12:10)  T(F): 98.3 (10-17-19 @ 04:00), Max: 99.7 (10-16-19 @ 16:44)  HR: 92 (10-17-19 @ 06:00) (78 - 95)  BP: 132/63 (10-17-19 @ 06:00) (117/81 - 185/85)  RR: 27 (10-17-19 @ 06:00) (18 - 33)  SpO2: 94% (10-17-19 @ 06:00) (91% - 100%) on (O2)    Telemetry/Alarms:    Relevant labs, radiology and Medications reviewed                        15.8   11.38 )-----------( 121      ( 17 Oct 2019 06:32 )             47.1     10-17    138  |  104  |  9   ----------------------------<  102<H>  3.5   |  26  |  0.69    Ca    8.0<L>      17 Oct 2019 06:32  Phos  2.0     10-17  Mg     2.0     10-17    TPro  7.2  /  Alb  3.9  /  TBili  1.0  /  DBili  0.3<H>  /  AST  32  /  ALT  26  /  AlkPhos  65  10-16    PT/INR - ( 16 Oct 2019 11:17 )   PT: 10.8 sec;   INR: 0.97 ratio         PTT - ( 16 Oct 2019 11:17 )  PTT:30.7 sec  MEDICATIONS  (STANDING):  heparin  Injectable 5000 Unit(s) SubCutaneous every 8 hours  lidocaine   Patch 2 Patch Transdermal daily  nicotine -  14 mG/24Hr(s) Patch 1 patch Transdermal daily  pantoprazole  Injectable 40 milliGRAM(s) IV Push daily  sodium chloride 0.9% 1000 milliLiter(s) (125 mL/Hr) IV Continuous <Continuous>    MEDICATIONS  (PRN):  acetaminophen   Tablet .. 650 milliGRAM(s) Oral every 6 hours PRN Temp greater or equal to 38C (100.4F), Mild Pain (1 - 3)  HYDROmorphone  Injectable 0.5 milliGRAM(s) IV Push every 4 hours PRN Moderate Pain (4 - 6)  HYDROmorphone  Injectable 1 milliGRAM(s) IV Push every 4 hours PRN Severe Pain (7 - 10)  LORazepam   Injectable 2 milliGRAM(s) IV Push every 2 hours PRN CIWA-Ar score increase by 2 points and a total score of 7 or less  ondansetron Injectable 4 milliGRAM(s) IV Push every 6 hours PRN Nausea      Physical exam  Gen NAD  Neuro AAOx3  Card tachy  Pulm clear, minimal crepitus palpated along chest wall  Abd soft  Ext warm        I&O's Summary    16 Oct 2019 07:01  -  17 Oct 2019 07:00  --------------------------------------------------------  IN: 1000 mL / OUT: 800 mL / NET: 200 mL        Assessment  59y Male  w/ PAST MEDICAL & SURGICAL HISTORY:  Prostate cancer  Scoliosis  No significant past surgical history  admitted with complaints of Patient is a 59y old  Male who presents with a chief complaint of rib fractures (16 Oct 2019 19:29)    PLAN  pain management  may start clears, if any difficulty swallowing or pain with clears, make NPO again.  incentive spirometry  OOB  CXr this am with no significant PTX visualized. Subcut air seen again.   daily CXR    Discussed with Cardiothoracic Team at AM rounds.

## 2019-10-17 NOTE — CONSULT NOTE ADULT - SUBJECTIVE AND OBJECTIVE BOX
60 yo M with pmh ETOH abuse, prostate CA, scoliosis presents s/p fall down 6 steps sustaining L 1-2 rib fractures, small L pneumothorax and pneumomediastinum.   Hospitalist consulted for medical management.   Pt appears uncomfortable during exam endorsing pain to left anterior chest wall worse on inspiration. No abd pain. No neck pain.   No hematochezia or melena. No hematuria.     Sub: febrile o/n with very productive sputum. Pt now endorses having productive cough before his fall/admission. CXR with L sided pna      Past Medical, Past Surgical, and Family History:  PAST MEDICAL HISTORY:  Prostate cancer     Scoliosis.     Shx: left foot surgery after complete avulsion      fhx: mother early CAD,  at 92; Father: copd     Social History:  Social History (marital status, living situation, occupation, tobacco use, alcohol and drug use, and sexual history): 1 ppd tobacco x 30 years, pt states 3 cocktails of vodka weekly, denied illicit drug use	     Tobacco Screening:  · Core Measure Site	Yes	  · Has the patient used tobacco in the past 30 days?	Yes	  · Tobacco Cessation Education/Counseling	Offered and patient declined	  · Tobacco Cessation Medication	Offered and patient accepted	            REVIEW OF SYSTEMS:    CONSTITUTIONAL: No weakness, fevers or chills  EYES/ENT: No visual changes;  No vertigo or throat pain   NECK: No pain or stiffness  RESPIRATORY: No cough, wheezing, hemoptysis; No shortness of breath  CARDIOVASCULAR: No chest pain or palpitations. + left chest wall pain  GASTROINTESTINAL: No abdominal or epigastric pain. No nausea, vomiting, or hematemesis; No diarrhea or constipation. No melena or hematochezia.  GENITOURINARY: No dysuria, frequency or hematuria  NEUROLOGICAL: No numbness or weakness  SKIN: No itching, burning, rashes, or lesions   All other review of systems is negative unless indicated above    ICU Vital Signs Last 24 Hrs  T(C): 38.8 (17 Oct 2019 08:19), Max: 38.8 (17 Oct 2019 08:19)  T(F): 101.8 (17 Oct 2019 08:19), Max: 101.8 (17 Oct 2019 08:19)  HR: 96 (17 Oct 2019 08:00) (78 - 96)  BP: 139/64 (17 Oct 2019 08:00) (117/81 - 185/85)  BP(mean): 83 (17 Oct 2019 08:00) (79 - 111)  ABP: --  ABP(mean): --  RR: 28 (17 Oct 2019 08:00) (20 - 33)  SpO2: 94% (17 Oct 2019 06:00) (91% - 97%)          PHYSICAL EXAM:    Constitutional: NAD, awake and alert, well-developed  HEENT: PERR, EOMI, Normal Hearing, MMM  Neck: Soft and supple, No LAD, No JVD  Respiratory: bronchial breath sounds to left lung base; poor inspiratory effocrt  Cardiovascular: S1 and S2, regular rate and rhythm, no Murmurs, gallops or rubs  Gastrointestinal: Bowel Sounds present, soft, nontender, nondistended, no guarding, no rebound  Extremities: No peripheral edema  Vascular: 2+ peripheral pulses  Neurological: A/O x 3, no focal deficits  Musculoskeletal: 5/5 strength b/l upper and lower extremities  Skin: No rashes

## 2019-10-18 LAB
HCT VFR BLD CALC: 47.8 % — SIGNIFICANT CHANGE UP (ref 39–50)
HGB BLD-MCNC: 16.4 G/DL — SIGNIFICANT CHANGE UP (ref 13–17)
MCHC RBC-ENTMCNC: 34.3 GM/DL — SIGNIFICANT CHANGE UP (ref 32–36)
MCHC RBC-ENTMCNC: 34.3 PG — HIGH (ref 27–34)
MCV RBC AUTO: 100 FL — SIGNIFICANT CHANGE UP (ref 80–100)
PLATELET # BLD AUTO: 112 K/UL — LOW (ref 150–400)
RBC # BLD: 4.78 M/UL — SIGNIFICANT CHANGE UP (ref 4.2–5.8)
RBC # FLD: 12.9 % — SIGNIFICANT CHANGE UP (ref 10.3–14.5)
WBC # BLD: 10.49 K/UL — SIGNIFICANT CHANGE UP (ref 3.8–10.5)
WBC # FLD AUTO: 10.49 K/UL — SIGNIFICANT CHANGE UP (ref 3.8–10.5)

## 2019-10-18 PROCEDURE — 99232 SBSQ HOSP IP/OBS MODERATE 35: CPT

## 2019-10-18 PROCEDURE — 71045 X-RAY EXAM CHEST 1 VIEW: CPT | Mod: 26

## 2019-10-18 RX ORDER — PANTOPRAZOLE SODIUM 20 MG/1
40 TABLET, DELAYED RELEASE ORAL
Refills: 0 | Status: DISCONTINUED | OUTPATIENT
Start: 2019-10-18 | End: 2019-10-21

## 2019-10-18 RX ORDER — INFLUENZA VIRUS VACCINE 15; 15; 15; 15 UG/.5ML; UG/.5ML; UG/.5ML; UG/.5ML
0.5 SUSPENSION INTRAMUSCULAR ONCE
Refills: 0 | Status: DISCONTINUED | OUTPATIENT
Start: 2019-10-18 | End: 2019-10-21

## 2019-10-18 RX ADMIN — LIDOCAINE 2 PATCH: 4 CREAM TOPICAL at 20:31

## 2019-10-18 RX ADMIN — Medication 1 PATCH: at 11:15

## 2019-10-18 RX ADMIN — HEPARIN SODIUM 5000 UNIT(S): 5000 INJECTION INTRAVENOUS; SUBCUTANEOUS at 05:43

## 2019-10-18 RX ADMIN — HYDROMORPHONE HYDROCHLORIDE 1 MILLIGRAM(S): 2 INJECTION INTRAMUSCULAR; INTRAVENOUS; SUBCUTANEOUS at 02:28

## 2019-10-18 RX ADMIN — LIDOCAINE 2 PATCH: 4 CREAM TOPICAL at 23:07

## 2019-10-18 RX ADMIN — HYDROMORPHONE HYDROCHLORIDE 1 MILLIGRAM(S): 2 INJECTION INTRAMUSCULAR; INTRAVENOUS; SUBCUTANEOUS at 08:38

## 2019-10-18 RX ADMIN — HYDROMORPHONE HYDROCHLORIDE 1 MILLIGRAM(S): 2 INJECTION INTRAMUSCULAR; INTRAVENOUS; SUBCUTANEOUS at 22:00

## 2019-10-18 RX ADMIN — HYDROMORPHONE HYDROCHLORIDE 1 MILLIGRAM(S): 2 INJECTION INTRAMUSCULAR; INTRAVENOUS; SUBCUTANEOUS at 08:53

## 2019-10-18 RX ADMIN — HYDROMORPHONE HYDROCHLORIDE 1 MILLIGRAM(S): 2 INJECTION INTRAMUSCULAR; INTRAVENOUS; SUBCUTANEOUS at 21:19

## 2019-10-18 RX ADMIN — Medication 600 MILLIGRAM(S): at 05:43

## 2019-10-18 RX ADMIN — CEFTRIAXONE 1000 MILLIGRAM(S): 500 INJECTION, POWDER, FOR SOLUTION INTRAMUSCULAR; INTRAVENOUS at 11:31

## 2019-10-18 RX ADMIN — Medication 1 PATCH: at 08:19

## 2019-10-18 RX ADMIN — Medication 1 PATCH: at 11:31

## 2019-10-18 RX ADMIN — Medication 1 PATCH: at 19:00

## 2019-10-18 RX ADMIN — HEPARIN SODIUM 5000 UNIT(S): 5000 INJECTION INTRAVENOUS; SUBCUTANEOUS at 21:20

## 2019-10-18 RX ADMIN — Medication 650 MILLIGRAM(S): at 12:30

## 2019-10-18 RX ADMIN — HEPARIN SODIUM 5000 UNIT(S): 5000 INJECTION INTRAVENOUS; SUBCUTANEOUS at 13:43

## 2019-10-18 RX ADMIN — PANTOPRAZOLE SODIUM 40 MILLIGRAM(S): 20 TABLET, DELAYED RELEASE ORAL at 11:29

## 2019-10-18 RX ADMIN — AZITHROMYCIN 255 MILLIGRAM(S): 500 TABLET, FILM COATED ORAL at 11:29

## 2019-10-18 RX ADMIN — Medication 650 MILLIGRAM(S): at 11:30

## 2019-10-18 RX ADMIN — LIDOCAINE 2 PATCH: 4 CREAM TOPICAL at 04:42

## 2019-10-18 RX ADMIN — HYDROMORPHONE HYDROCHLORIDE 1 MILLIGRAM(S): 2 INJECTION INTRAMUSCULAR; INTRAVENOUS; SUBCUTANEOUS at 02:50

## 2019-10-18 RX ADMIN — LIDOCAINE 2 PATCH: 4 CREAM TOPICAL at 11:49

## 2019-10-18 NOTE — CONSULT NOTE ADULT - ASSESSMENT
58 yo M with pmh ETOH abuse, prostate CA, scoliosis presents s/p fall down 6 steps sustaining L 1-2 rib fractures, small L pneumothorax and pneumomediastinum. Hospitalist consulted for medical management    #L multiple rib fractures after mechanical fall  #L PTX - post traumatic  #Pneumomediastinum - post traumatic  #ETOH use  - Pain control  - Incentive spirometer  - Continuous pulse ox  - Serial CXR for evolution of PTX  - No e/o withdrawal  - Cont CIWA protocol  - EKG noted, no dynamic changes    #Sepsis/CAP  - POA  - pt had constitutional symptoms prior to arrival  - CXR noted  - Collect blood and sputum cultures: reviewed  - RVP neg  - Start rocephin and zithro for suspected GN rods pna  - tyelnol prn  - anti-tussive/mucolytics, add hycodan prn   -serial cxr per surgery       DVT px  d/w nursing

## 2019-10-18 NOTE — PROGRESS NOTE ADULT - SUBJECTIVE AND OBJECTIVE BOX
Subjective:  59y Male admitted 10/16 s/p fall  +ETOH, fall down 5 steps found to have left 1-2 rib fractures with small left pneumothorax, pneumomediastinum.   10/17/19 Pt seen, c/o back discomfort in bed. Currently w temp of 101.8. Denies difficultyl breathing/swallowing, chest pain with swallowing.  10/18/19 Pt seen, c/o chest discomfort, right anterior lower chest. Coughing up much sputum, noted to have a fever yesterday. Started on ABX for CAP. Eating without difficulty.    Vital Signs:  Vital Signs Last 24 Hrs  T(C): 37.1 (10-18-19 @ 05:00), Max: 38.8 (10-17-19 @ 14:31)  T(F): 98.7 (10-18-19 @ 05:00), Max: 101.9 (10-17-19 @ 14:31)  HR: 92 (10-18-19 @ 08:00) (86 - 104)  BP: 134/75 (10-18-19 @ 08:00) (121/62 - 164/88)  RR: 25 (10-18-19 @ 08:00) (22 - 30)  SpO2: 91% (10-18-19 @ 08:00) (90% - 96%) on (O2)    Telemetry/Alarms:    Relevant labs, radiology and Medications reviewed                        16.4   10.49 )-----------( 112      ( 18 Oct 2019 06:36 )             47.8     10-17    138  |  104  |  9   ----------------------------<  102<H>  3.5   |  26  |  0.69    Ca    8.0<L>      17 Oct 2019 06:32  Phos  2.0     10-17  Mg     2.0     10-17    TPro  7.2  /  Alb  3.9  /  TBili  1.0  /  DBili  0.3<H>  /  AST  32  /  ALT  26  /  AlkPhos  65  10-16    PT/INR - ( 16 Oct 2019 11:17 )   PT: 10.8 sec;   INR: 0.97 ratio         PTT - ( 16 Oct 2019 11:17 )  PTT:30.7 sec  MEDICATIONS  (STANDING):  azithromycin  IVPB      azithromycin  IVPB 500 milliGRAM(s) IV Intermittent every 24 hours  cefTRIAXone Injectable. 1000 milliGRAM(s) IV Push every 24 hours  cefTRIAXone Injectable.      guaiFENesin  milliGRAM(s) Oral every 12 hours  heparin  Injectable 5000 Unit(s) SubCutaneous every 8 hours  influenza   Vaccine 0.5 milliLiter(s) IntraMuscular once  lidocaine   Patch 2 Patch Transdermal daily  nicotine -  14 mG/24Hr(s) Patch 1 patch Transdermal daily  pantoprazole  Injectable 40 milliGRAM(s) IV Push daily  sodium chloride 0.9% 1000 milliLiter(s) (125 mL/Hr) IV Continuous <Continuous>    MEDICATIONS  (PRN):  acetaminophen   Tablet .. 650 milliGRAM(s) Oral every 6 hours PRN Temp greater or equal to 38C (100.4F), Mild Pain (1 - 3)  HYDROmorphone  Injectable 0.5 milliGRAM(s) IV Push every 4 hours PRN Moderate Pain (4 - 6)  HYDROmorphone  Injectable 1 milliGRAM(s) IV Push every 4 hours PRN Severe Pain (7 - 10)  LORazepam   Injectable 2 milliGRAM(s) IV Push every 2 hours PRN CIWA-Ar score increase by 2 points and a total score of 7 or less  ondansetron Injectable 4 milliGRAM(s) IV Push every 6 hours PRN Nausea      Physical exam  Gen NAD  Neuro AAOx3  Card RRR  Pulm fine rales ausculated left chest, crepitus over sternum noted  Abd soft, NT  Ext warm      I&O's Summary    17 Oct 2019 07:01  -  18 Oct 2019 07:00  --------------------------------------------------------  IN: 0 mL / OUT: 1250 mL / NET: -1250 mL        Assessment  59y Male  w/ PAST MEDICAL & SURGICAL HISTORY:  Prostate cancer  Scoliosis  No significant past surgical history  admitted with complaints of Patient is a 59y old  Male who presents with a chief complaint of rib fractures (17 Oct 2019 11:24)      PLAN  pain meds  f/u today CXR  OOB  abx ordered for presumed CAP  incentive spirometry  tylenol    Discussed with Cardiothoracic Team at AM rounds.

## 2019-10-18 NOTE — PROGRESS NOTE ADULT - SUBJECTIVE AND OBJECTIVE BOX
CC:Patient is a 59y old  Male who presents with a chief complaint of rib fractures (18 Oct 2019 09:36)      Subjective:  Pt seen and examined at bedside with chaperone. Pt is AAOx3, pt in no acute distress. Pt denied c/o fever, chills, chest pain, SOB, abd pain, N/V/D, extremity pain or dysfunction, hemoptysis, hematemesis, hematuria, hematochexia, headache, diplopia, vertigo, dizzyness. Pt tolerating diet, (+) void, (+) ambulation, (+) bowel function    ROS:  otherwise as abovementioned ROS    Vital Signs Last 24 Hrs  T(C): 36.9 (18 Oct 2019 09:42), Max: 38.8 (17 Oct 2019 14:31)  T(F): 98.4 (18 Oct 2019 09:42), Max: 101.9 (17 Oct 2019 14:31)  HR: 87 (18 Oct 2019 10:00) (86 - 104)  BP: 120/72 (18 Oct 2019 10:00) (120/72 - 164/88)  BP(mean): 83 (18 Oct 2019 10:00) (74 - 105)  RR: 20 (18 Oct 2019 10:00) (20 - 30)  SpO2: 94% (18 Oct 2019 10:00) (90% - 96%)    Labs:      CARDIAC MARKERS ( 16 Oct 2019 13:26 )  <0.015 ng/mL / x     / x     / x     / x                                16.4   10.49 )-----------( 112      ( 18 Oct 2019 06:36 )             47.8     CBC Full  -  ( 18 Oct 2019 06:36 )  WBC Count : 10.49 K/uL  RBC Count : 4.78 M/uL  Hemoglobin : 16.4 g/dL  Hematocrit : 47.8 %  Platelet Count - Automated : 112 K/uL  Mean Cell Volume : 100.0 fl  Mean Cell Hemoglobin : 34.3 pg  Mean Cell Hemoglobin Concentration : 34.3 gm/dL  Auto Neutrophil # : x  Auto Lymphocyte # : x  Auto Monocyte # : x  Auto Eosinophil # : x  Auto Basophil # : x  Auto Neutrophil % : x  Auto Lymphocyte % : x  Auto Monocyte % : x  Auto Eosinophil % : x  Auto Basophil % : x    10-17    138  |  104  |  9   ----------------------------<  102<H>  3.5   |  26  |  0.69    Ca    8.0<L>      17 Oct 2019 06:32  Phos  2.0     10-17  Mg     2.0     10-17    TPro  7.2  /  Alb  3.9  /  TBili  1.0  /  DBili  0.3<H>  /  AST  32  /  ALT  26  /  AlkPhos  65  10-16    LIVER FUNCTIONS - ( 16 Oct 2019 13:26 )  Alb: 3.9 g/dL / Pro: 7.2 gm/dL / ALK PHOS: 65 U/L / ALT: 26 U/L / AST: 32 U/L / GGT: x                 Meds:  acetaminophen   Tablet .. 650 milliGRAM(s) Oral every 6 hours PRN  azithromycin  IVPB      azithromycin  IVPB 500 milliGRAM(s) IV Intermittent every 24 hours  cefTRIAXone Injectable. 1000 milliGRAM(s) IV Push every 24 hours  cefTRIAXone Injectable.      guaiFENesin  milliGRAM(s) Oral every 12 hours  heparin  Injectable 5000 Unit(s) SubCutaneous every 8 hours  HYDROmorphone  Injectable 0.5 milliGRAM(s) IV Push every 4 hours PRN  HYDROmorphone  Injectable 1 milliGRAM(s) IV Push every 4 hours PRN  influenza   Vaccine 0.5 milliLiter(s) IntraMuscular once  lidocaine   Patch 2 Patch Transdermal daily  LORazepam   Injectable 2 milliGRAM(s) IV Push every 2 hours PRN  nicotine -  14 mG/24Hr(s) Patch 1 patch Transdermal daily  ondansetron Injectable 4 milliGRAM(s) IV Push every 6 hours PRN  pantoprazole    Tablet 40 milliGRAM(s) Oral before breakfast  sodium chloride 0.9% 1000 milliLiter(s) IV Continuous <Continuous>      Radiology:  Pending cxr 10/18/19    < from: Xray Chest 1 View- PORTABLE-Routine (10.17.19 @ 10:35) >  EXAM:  XR CHEST PORTABLE ROUTINE 1V                            PROCEDURE DATE:  10/17/2019          INTERPRETATION:  History: Dyspnea    Chest:  one view.      Comparison: 10/16/2019    AP radiograph of the chest demonstrates atelectasis in the LEFT lower   lobe with mild mediastinal shift to the LEFT.. Increasing subcutaneous   emphysema overlying the LEFT chest. The cardiac silhouette is normal in   size. Osseous structures are intact.    Impression: atelectasis in the LEFT lower lobe with mild mediastinal   shift to the LEFT.. Increasing subcutaneous emphysema overlying the LEFT   chest.                TIM DUNCAN M.D., ATTENDING RADIOLOGIST  This document has been electronically signed. Oct 17 2019  2:47PM    < end of copied text >      Physical exam:  GCS of 15  Pt is aaox3  Pt in no acute distress  Airway is patent  Breathing is symmetric and unlabored  Neuro: CN II-XII grossly intact  Psych: normal affect  HEENT: normocephalic, RODGER, EOM wnl, no gross craniofacial bony pathology to exam  Neck: No tracheal deviation, no JVD, no crepitus, no ecchymosis, no hematoma  Chest: No gross rib or sternal pathology or tenderness to exam, no crepitus, no ecchymosis, no hematoma  Resp: CTAB  CVS: S1S2(+)  ABD: bowel sounds (+), soft, nontender, non distended, no rebound, no guarding, no rigidity, no pelvic instability to exam  EXT: no calf tenderness or edema to exam b/l, pt has good capillary refill in all digits. Sensoromotor function grossly intact, on VTE prophylaxis  Skin: no adverse skin changes to exam CC:Patient is a 59y old  Male who presents with a chief complaint of rib fractures (18 Oct 2019 09:36)      Subjective:  Pt seen and examined at bedside with chaperone. Pt is AAOx3, pt in no acute distress. Pt states c/o left rib pain from known fracture pathology. Pt denied c/o fever, chills, SOB, abd pain, N/V/D, extremity pain or dysfunction, hemoptysis, hematemesis, hematuria, hematochexia, headache, diplopia, vertigo, dizzyness. Pt tolerating diet, (+) void, (+) oob, (+) bowel function    ROS:  rib pain, otherwise as abovementioned ROS    Vital Signs Last 24 Hrs  T(C): 36.9 (18 Oct 2019 09:42), Max: 38.8 (17 Oct 2019 14:31)  T(F): 98.4 (18 Oct 2019 09:42), Max: 101.9 (17 Oct 2019 14:31)  HR: 87 (18 Oct 2019 10:00) (86 - 104)  BP: 120/72 (18 Oct 2019 10:00) (120/72 - 164/88)  BP(mean): 83 (18 Oct 2019 10:00) (74 - 105)  RR: 20 (18 Oct 2019 10:00) (20 - 30)  SpO2: 94% (18 Oct 2019 10:00) (90% - 96%)    Labs:      CARDIAC MARKERS ( 16 Oct 2019 13:26 )  <0.015 ng/mL / x     / x     / x     / x                                16.4   10.49 )-----------( 112      ( 18 Oct 2019 06:36 )             47.8     CBC Full  -  ( 18 Oct 2019 06:36 )  WBC Count : 10.49 K/uL  RBC Count : 4.78 M/uL  Hemoglobin : 16.4 g/dL  Hematocrit : 47.8 %  Platelet Count - Automated : 112 K/uL  Mean Cell Volume : 100.0 fl  Mean Cell Hemoglobin : 34.3 pg  Mean Cell Hemoglobin Concentration : 34.3 gm/dL  Auto Neutrophil # : x  Auto Lymphocyte # : x  Auto Monocyte # : x  Auto Eosinophil # : x  Auto Basophil # : x  Auto Neutrophil % : x  Auto Lymphocyte % : x  Auto Monocyte % : x  Auto Eosinophil % : x  Auto Basophil % : x    10-17    138  |  104  |  9   ----------------------------<  102<H>  3.5   |  26  |  0.69    Ca    8.0<L>      17 Oct 2019 06:32  Phos  2.0     10-17  Mg     2.0     10-17    TPro  7.2  /  Alb  3.9  /  TBili  1.0  /  DBili  0.3<H>  /  AST  32  /  ALT  26  /  AlkPhos  65  10-16    LIVER FUNCTIONS - ( 16 Oct 2019 13:26 )  Alb: 3.9 g/dL / Pro: 7.2 gm/dL / ALK PHOS: 65 U/L / ALT: 26 U/L / AST: 32 U/L / GGT: x                 Meds:  acetaminophen   Tablet .. 650 milliGRAM(s) Oral every 6 hours PRN  azithromycin  IVPB      azithromycin  IVPB 500 milliGRAM(s) IV Intermittent every 24 hours  cefTRIAXone Injectable. 1000 milliGRAM(s) IV Push every 24 hours  cefTRIAXone Injectable.      guaiFENesin  milliGRAM(s) Oral every 12 hours  heparin  Injectable 5000 Unit(s) SubCutaneous every 8 hours  HYDROmorphone  Injectable 0.5 milliGRAM(s) IV Push every 4 hours PRN  HYDROmorphone  Injectable 1 milliGRAM(s) IV Push every 4 hours PRN  influenza   Vaccine 0.5 milliLiter(s) IntraMuscular once  lidocaine   Patch 2 Patch Transdermal daily  LORazepam   Injectable 2 milliGRAM(s) IV Push every 2 hours PRN  nicotine -  14 mG/24Hr(s) Patch 1 patch Transdermal daily  ondansetron Injectable 4 milliGRAM(s) IV Push every 6 hours PRN  pantoprazole    Tablet 40 milliGRAM(s) Oral before breakfast  sodium chloride 0.9% 1000 milliLiter(s) IV Continuous <Continuous>      Radiology:  < from: Xray Chest 1 View- PORTABLE-Routine (10.18.19 @ 10:33) >  EXAM:  XR CHEST PORTABLE ROUTINE 1V                            PROCEDURE DATE:  10/18/2019          INTERPRETATION:  Portable chest radiograph dated 10/18/2019.    COMPARISON: 10/17/2019.    CLINICAL INFORMATION: Evaluate for pneumothorax.    FINDINGS:    The airway is midline.  The left lower lobe remains atelectatic. The right lung is clear.  There is no pleural effusion or pneumothorax.   Extensive soft tissue emphysema in the left shoulder and the left chest   wall is again noted.  Cardiomegaly?  The bones are normal.     IMPRESSION:  Complete atelectasis of the left lower lobe is unchanged.   No evidence of a pneumothorax, but soft tissue emphysema in the left   chest wall.                MARIANELA VILA M.D., ATTENDING RADIOLOGIST  Thisdocument has been electronically signed. Oct 18 2019 11:56AM    < end of copied text >      < from: Xray Chest 1 View- PORTABLE-Routine (10.17.19 @ 10:35) >  EXAM:  XR CHEST PORTABLE ROUTINE 1V                            PROCEDURE DATE:  10/17/2019          INTERPRETATION:  History: Dyspnea    Chest:  one view.      Comparison: 10/16/2019    AP radiograph of the chest demonstrates atelectasis in the LEFT lower   lobe with mild mediastinal shift to the LEFT.. Increasing subcutaneous   emphysema overlying the LEFT chest. The cardiac silhouette is normal in   size. Osseous structures are intact.    Impression: atelectasis in the LEFT lower lobe with mild mediastinal   shift to the LEFT.. Increasing subcutaneous emphysema overlying the LEFT   chest.                TIM DUNCAN M.D., ATTENDING RADIOLOGIST  This document has been electronically signed. Oct 17 2019  2:47PM    < end of copied text >      Physical exam:  GCS of 15  Pt is aaox3  Pt in no acute distress  Airway is patent  Breathing is symmetric and unlabored  Neuro: CN II-XII grossly intact  Psych: normal affect  HEENT: normocephalic, RODGER, EOM wnl, no gross craniofacial bony pathology to exam  Neck: No tracheal deviation, no JVD, (+) crepitus, no ecchymosis, no hematoma  Chest: No gross right rib or sternal pathology or tenderness to exam, (+) tenderness to left anterior and posterior chest wall, (+) crepitus to left chest wall, no ecchymosis, no hematoma  Resp: (+) rhonchi, incentive spirometry use encouraged  CVS: S1S2(+)  ABD: bowel sounds (+), soft, nontender, non distended, no rebound, no guarding, no rigidity, no pelvic instability to exam  EXT: no calf tenderness or edema to exam b/l, pt has good capillary refill in all digits. Sensoromotor function grossly intact, on VTE prophylaxis  Skin: no adverse skin changes to exam

## 2019-10-18 NOTE — CONSULT NOTE ADULT - SUBJECTIVE AND OBJECTIVE BOX
60 yo M with pmh ETOH abuse, prostate CA, scoliosis presents s/p fall down 6 steps sustaining L 1-2 rib fractures, small L pneumothorax and pneumomediastinum.   Hospitalist consulted for medical management.   Pt appears uncomfortable during exam endorsing pain to left anterior chest wall worse on inspiration. No abd pain. No neck pain.   No hematochezia or melena. No hematuria.     Sub: afebrile for 24 hours o/n with very productive sputum.       Past Medical, Past Surgical, and Family History:  PAST MEDICAL HISTORY:  Prostate cancer     Scoliosis.     Shx: left foot surgery after complete avulsion      fhx: mother early CAD,  at 92; Father: copd     Social History:  Social History (marital status, living situation, occupation, tobacco use, alcohol and drug use, and sexual history): 1 ppd tobacco x 30 years, pt states 3 cocktails of vodka weekly, denied illicit drug use	     Tobacco Screening:  · Core Measure Site	Yes	  · Has the patient used tobacco in the past 30 days?	Yes	  · Tobacco Cessation Education/Counseling	Offered and patient declined	  · Tobacco Cessation Medication	Offered and patient accepted	            REVIEW OF SYSTEMS:    CONSTITUTIONAL: No weakness, fevers or chills  EYES/ENT: No visual changes;  No vertigo or throat pain   NECK: No pain or stiffness  RESPIRATORY: No cough, wheezing, hemoptysis; No shortness of breath  CARDIOVASCULAR: No chest pain or palpitations. + left chest wall pain  GASTROINTESTINAL: No abdominal or epigastric pain. No nausea, vomiting, or hematemesis; No diarrhea or constipation. No melena or hematochezia.  GENITOURINARY: No dysuria, frequency or hematuria  NEUROLOGICAL: No numbness or weakness  SKIN: No itching, burning, rashes, or lesions   All other review of systems is negative unless indicated above    ICU Vital Signs Last 24 Hrs  T(C): 36.9 (18 Oct 2019 09:42), Max: 38.8 (17 Oct 2019 14:31)  T(F): 98.4 (18 Oct 2019 09:42), Max: 101.9 (17 Oct 2019 14:31)  HR: 84 (18 Oct 2019 11:00) (84 - 104)  BP: 124/69 (18 Oct 2019 11:00) (120/72 - 164/88)  BP(mean): 81 (18 Oct 2019 11:00) (74 - 105)  ABP: --  ABP(mean): --  RR: 19 (18 Oct 2019 11:00) (19 - 30)  SpO2: 96% (18 Oct 2019 11:00) (90% - 96%)            PHYSICAL EXAM:    Constitutional: NAD, awake and alert, well-developed  HEENT: PERR, EOMI, Normal Hearing, MMM  Neck: Soft and supple, No LAD, No JVD  Respiratory: bronchial breath sounds to left lung base; poor inspiratory effocrt  Cardiovascular: S1 and S2, regular rate and rhythm, no Murmurs, gallops or rubs  Gastrointestinal: Bowel Sounds present, soft, nontender, nondistended, no guarding, no rebound  Extremities: No peripheral edema  Vascular: 2+ peripheral pulses  Neurological: A/O x 3, no focal deficits  Musculoskeletal: 5/5 strength b/l upper and lower extremities  Skin: No rashes

## 2019-10-18 NOTE — PROGRESS NOTE ADULT - ATTENDING COMMENTS
Patient seen and examined. Subq air and pneumomediastinum is improving with time. He denies any swallowing issues and tolerating regular diet.   CXR looks better today than compared to yesterday. I do not think there is any indication for surgical intervention at this time.    My recommendations are:   1) Pulmonary toilet, encouraged IS, ambulation, pain control   2) cont abx for pneumonia   3) We may repeat CT Chest is few days depending on what the CXR looks like    Pneumomediastinum and subq air usually will resolve over time   Thank you for the consult, if there are any questions or concerns please don't hesitate to call my cell      N Clive   Thoracic Surgery staff

## 2019-10-18 NOTE — PROGRESS NOTE ADULT - ASSESSMENT
A/P:  Left 1-2nd rib fractures  Large left subcutaneous emphysema  Pneumomediastinum  S/P fall down 6 steps 10/15/19  Thoracic surgery on consult, management and further workup per thoracic surgery  CIWA protocol  GI/DVT prophylaxis  Pain control  Incentive spirometry  F/U labs, radiologic studies  Hospitalist on consult for medical management   consult for etoh  Physical therapy  Cont current care and meds  Pt will be monitored for signs of evolution/resolution of pathology and surgical intervention as required and warranted  Pt aware of and agrees with all of the above

## 2019-10-19 DIAGNOSIS — J98.2 INTERSTITIAL EMPHYSEMA: ICD-10-CM

## 2019-10-19 DIAGNOSIS — S22.42XA MULTIPLE FRACTURES OF RIBS, LEFT SIDE, INITIAL ENCOUNTER FOR CLOSED FRACTURE: ICD-10-CM

## 2019-10-19 LAB
CULTURE RESULTS: SIGNIFICANT CHANGE UP
HCT VFR BLD CALC: 46.9 % — SIGNIFICANT CHANGE UP (ref 39–50)
HGB BLD-MCNC: 16 G/DL — SIGNIFICANT CHANGE UP (ref 13–17)
MCHC RBC-ENTMCNC: 34 PG — SIGNIFICANT CHANGE UP (ref 27–34)
MCHC RBC-ENTMCNC: 34.1 GM/DL — SIGNIFICANT CHANGE UP (ref 32–36)
MCV RBC AUTO: 99.8 FL — SIGNIFICANT CHANGE UP (ref 80–100)
PLATELET # BLD AUTO: 123 K/UL — LOW (ref 150–400)
RBC # BLD: 4.7 M/UL — SIGNIFICANT CHANGE UP (ref 4.2–5.8)
RBC # FLD: 12.8 % — SIGNIFICANT CHANGE UP (ref 10.3–14.5)
SPECIMEN SOURCE: SIGNIFICANT CHANGE UP
WBC # BLD: 7.67 K/UL — SIGNIFICANT CHANGE UP (ref 3.8–10.5)
WBC # FLD AUTO: 7.67 K/UL — SIGNIFICANT CHANGE UP (ref 3.8–10.5)

## 2019-10-19 PROCEDURE — 71045 X-RAY EXAM CHEST 1 VIEW: CPT | Mod: 26

## 2019-10-19 PROCEDURE — 99231 SBSQ HOSP IP/OBS SF/LOW 25: CPT

## 2019-10-19 PROCEDURE — 99232 SBSQ HOSP IP/OBS MODERATE 35: CPT

## 2019-10-19 RX ADMIN — HEPARIN SODIUM 5000 UNIT(S): 5000 INJECTION INTRAVENOUS; SUBCUTANEOUS at 21:47

## 2019-10-19 RX ADMIN — Medication 650 MILLIGRAM(S): at 19:00

## 2019-10-19 RX ADMIN — LIDOCAINE 2 PATCH: 4 CREAM TOPICAL at 11:54

## 2019-10-19 RX ADMIN — Medication 1 PATCH: at 11:54

## 2019-10-19 RX ADMIN — LIDOCAINE 2 PATCH: 4 CREAM TOPICAL at 21:44

## 2019-10-19 RX ADMIN — LIDOCAINE 2 PATCH: 4 CREAM TOPICAL at 22:15

## 2019-10-19 RX ADMIN — Medication 1 PATCH: at 05:22

## 2019-10-19 RX ADMIN — Medication 650 MILLIGRAM(S): at 18:19

## 2019-10-19 RX ADMIN — AZITHROMYCIN 255 MILLIGRAM(S): 500 TABLET, FILM COATED ORAL at 11:53

## 2019-10-19 RX ADMIN — Medication 600 MILLIGRAM(S): at 18:23

## 2019-10-19 RX ADMIN — Medication 1 PATCH: at 11:52

## 2019-10-19 RX ADMIN — PANTOPRAZOLE SODIUM 40 MILLIGRAM(S): 20 TABLET, DELAYED RELEASE ORAL at 05:21

## 2019-10-19 RX ADMIN — Medication 600 MILLIGRAM(S): at 05:21

## 2019-10-19 RX ADMIN — HEPARIN SODIUM 5000 UNIT(S): 5000 INJECTION INTRAVENOUS; SUBCUTANEOUS at 14:22

## 2019-10-19 RX ADMIN — CEFTRIAXONE 1000 MILLIGRAM(S): 500 INJECTION, POWDER, FOR SOLUTION INTRAMUSCULAR; INTRAVENOUS at 11:53

## 2019-10-19 RX ADMIN — HEPARIN SODIUM 5000 UNIT(S): 5000 INJECTION INTRAVENOUS; SUBCUTANEOUS at 05:21

## 2019-10-19 RX ADMIN — Medication 1 PATCH: at 21:45

## 2019-10-19 NOTE — CONSULT NOTE ADULT - SUBJECTIVE AND OBJECTIVE BOX
60 yo M with pmh ETOH abuse, prostate CA, scoliosis presents s/p fall down 6 steps sustaining L 1-2 rib fractures, small L pneumothorax and pneumomediastinum.   Hospitalist consulted for medical management.   Pt appears uncomfortable during exam endorsing pain to left anterior chest wall worse on inspiration. No abd pain. No neck pain.   No hematochezia or melena. No hematuria.     Sub: afebrile for 24 hours o/n with very productive sputum. Sputum: + H. influenza      Past Medical, Past Surgical, and Family History:  PAST MEDICAL HISTORY:  Prostate cancer     Scoliosis.     Shx: left foot surgery after complete avulsion      fhx: mother early CAD,  at 92; Father: copd     Social History:  Social History (marital status, living situation, occupation, tobacco use, alcohol and drug use, and sexual history): 1 ppd tobacco x 30 years, pt states 3 cocktails of vodka weekly, denied illicit drug use	     Tobacco Screening:  · Core Measure Site	Yes	  · Has the patient used tobacco in the past 30 days?	Yes	  · Tobacco Cessation Education/Counseling	Offered and patient declined	  · Tobacco Cessation Medication	Offered and patient accepted	            REVIEW OF SYSTEMS:    CONSTITUTIONAL: No weakness, fevers or chills  EYES/ENT: No visual changes;  No vertigo or throat pain   NECK: No pain or stiffness  RESPIRATORY: No cough, wheezing, hemoptysis; No shortness of breath  CARDIOVASCULAR: No chest pain or palpitations. + left chest wall pain  GASTROINTESTINAL: No abdominal or epigastric pain. No nausea, vomiting, or hematemesis; No diarrhea or constipation. No melena or hematochezia.  GENITOURINARY: No dysuria, frequency or hematuria  NEUROLOGICAL: No numbness or weakness  SKIN: No itching, burning, rashes, or lesions   All other review of systems is negative unless indicated above    ICU Vital Signs Last 24 Hrs  T(C): 36.3 (19 Oct 2019 09:23), Max: 37.1 (19 Oct 2019 05:00)  T(F): 97.3 (19 Oct 2019 09:23), Max: 98.8 (19 Oct 2019 05:00)  HR: 79 (19 Oct 2019 08:00) (67 - 102)  BP: 103/83 (19 Oct 2019 06:00) (103/83 - 141/66)  BP(mean): 85 (19 Oct 2019 03:00) (63 - 88)  ABP: --  ABP(mean): --  RR: 16 (19 Oct 2019 08:00) (16 - 25)  SpO2: 95% (19 Oct 2019 08:00) (94% - 100%)            PHYSICAL EXAM:    Constitutional: NAD, awake and alert, well-developed  HEENT: PERR, EOMI, Normal Hearing, MMM  Neck: Soft and supple, No LAD, No JVD  Respiratory: bronchial breath sounds to left lung base; poor inspiratory effocrt  Cardiovascular: S1 and S2, regular rate and rhythm, no Murmurs, gallops or rubs  Gastrointestinal: Bowel Sounds present, soft, nontender, nondistended, no guarding, no rebound  Extremities: No peripheral edema  Vascular: 2+ peripheral pulses  Neurological: A/O x 3, no focal deficits  Musculoskeletal: 5/5 strength b/l upper and lower extremities  Skin: No rashes

## 2019-10-19 NOTE — PROGRESS NOTE ADULT - ASSESSMENT
A/P:  Left 1-2nd rib fractures  Large left subcutaneous emphysema  Pneumomediastinum  S/P fall down 6 steps 10/15/19  Thoracic surgery on consult, management and further workup per thoracic surgery  CIWA protocol  GI/DVT prophylaxis  Pain control  Incentive spirometry  F/U labs, radiologic studies  Hospitalist on consult for medical management   consult for etoh, post d/c needs  Physical therapy  Cont current care and meds  Pt will be monitored for signs of evolution/resolution of pathology and surgical intervention as required and warranted  Pt aware of and agrees with all of the above

## 2019-10-19 NOTE — PROGRESS NOTE ADULT - SUBJECTIVE AND OBJECTIVE BOX
CC:Patient is a 59y old  Male who presents with a chief complaint of rib fractures (18 Oct 2019 12:38)      Subjective:  Pt seen and examined at bedside with chaperone. Pt is AAOx3, pt in no acute distress. Pt states c/o left rib pain from known fracture pathology. Pt denied c/o fever, chills, SOB, abd pain, N/V/D, extremity pain or dysfunction, hemoptysis, hematemesis, hematuria, hematochexia, headache, diplopia, vertigo, dizzyness. Pt tolerating diet, (+) void, (+) oob, (+) bowel function    ROS:  rib pain, otherwise as abovementioned ROS    Vital Signs Last 24 Hrs  T(C): 37.1 (19 Oct 2019 05:00), Max: 37.1 (19 Oct 2019 05:00)  T(F): 98.8 (19 Oct 2019 05:00), Max: 98.8 (19 Oct 2019 05:00)  HR: 102 (19 Oct 2019 06:00) (67 - 102)  BP: 103/83 (19 Oct 2019 06:00) (103/83 - 141/66)  BP(mean): 85 (19 Oct 2019 03:00) (63 - 88)  RR: 22 (19 Oct 2019 03:00) (16 - 25)  SpO2: 97% (19 Oct 2019 03:00) (91% - 100%)    Labs:                                16.0   7.67  )-----------( 123      ( 19 Oct 2019 06:33 )             46.9     CBC Full  -  ( 19 Oct 2019 06:33 )  WBC Count : 7.67 K/uL  RBC Count : 4.70 M/uL  Hemoglobin : 16.0 g/dL  Hematocrit : 46.9 %  Platelet Count - Automated : 123 K/uL  Mean Cell Volume : 99.8 fl  Mean Cell Hemoglobin : 34.0 pg  Mean Cell Hemoglobin Concentration : 34.1 gm/dL  Auto Neutrophil # : x  Auto Lymphocyte # : x  Auto Monocyte # : x  Auto Eosinophil # : x  Auto Basophil # : x  Auto Neutrophil % : x  Auto Lymphocyte % : x  Auto Monocyte % : x  Auto Eosinophil % : x  Auto Basophil % : x                  Meds:  acetaminophen   Tablet .. 650 milliGRAM(s) Oral every 6 hours PRN  azithromycin  IVPB      azithromycin  IVPB 500 milliGRAM(s) IV Intermittent every 24 hours  cefTRIAXone Injectable. 1000 milliGRAM(s) IV Push every 24 hours  cefTRIAXone Injectable.      guaiFENesin  milliGRAM(s) Oral every 12 hours  heparin  Injectable 5000 Unit(s) SubCutaneous every 8 hours  HYDROcodone/homatropine Syrup 5 milliLiter(s) Oral every 6 hours PRN  HYDROmorphone  Injectable 0.5 milliGRAM(s) IV Push every 4 hours PRN  HYDROmorphone  Injectable 1 milliGRAM(s) IV Push every 4 hours PRN  influenza   Vaccine 0.5 milliLiter(s) IntraMuscular once  lidocaine   Patch 2 Patch Transdermal daily  LORazepam   Injectable 2 milliGRAM(s) IV Push every 2 hours PRN  nicotine -  14 mG/24Hr(s) Patch 1 patch Transdermal daily  ondansetron Injectable 4 milliGRAM(s) IV Push every 6 hours PRN  pantoprazole    Tablet 40 milliGRAM(s) Oral before breakfast  sodium chloride 0.9% 1000 milliLiter(s) IV Continuous <Continuous>      Radiology:  Pending cxr    Physical exam:  GCS of 15  Pt is aaox3  Pt in no acute distress  Airway is patent  Breathing is symmetric and unlabored  Neuro: CN II-XII grossly intact  Psych: normal affect  HEENT: normocephalic, RODGER, EOM wnl, no gross craniofacial bony pathology to exam  Neck: No tracheal deviation, no JVD, (+) crepitus, no ecchymosis, no hematoma  Chest: No gross right rib or sternal pathology or tenderness to exam, (+) tenderness to left anterior and posterior chest wall, (+) crepitus to left chest wall, no ecchymosis, no hematoma  Resp: (+) rhonchi, incentive spirometry use encouraged  CVS: S1S2(+)  ABD: bowel sounds (+), soft, nontender, non distended, no rebound, no guarding, no rigidity, no pelvic instability to exam  EXT: no calf tenderness or edema to exam b/l, pt has good capillary refill in all digits. Sensoromotor function grossly intact, on VTE prophylaxis  Skin: no adverse skin changes to exam

## 2019-10-19 NOTE — PROGRESS NOTE ADULT - SUBJECTIVE AND OBJECTIVE BOX
Subjective: Patient admits chest pain improving. Denies fever, chills, cough, SOB. Tolerating diet.     Vital Signs:  Vital Signs Last 24 Hrs  T(C): 36.3 (10-19-19 @ 09:23), Max: 37.1 (10-19-19 @ 05:00)  T(F): 97.3 (10-19-19 @ 09:23), Max: 98.8 (10-19-19 @ 05:00)  HR: 79 (10-19-19 @ 12:00) (67 - 102)  BP: 133/76 (10-19-19 @ 12:00) (103/83 - 180/80)  RR: 16 (10-19-19 @ 12:00) (15 - 25)  SpO2: 97% (10-19-19 @ 12:00) (94% - 100%) on (O2)    I&O's Detail    18 Oct 2019 07:01  -  19 Oct 2019 07:00  --------------------------------------------------------  IN:  Total IN: 0 mL    OUT:    Voided: 600 mL  Total OUT: 600 mL    Total NET: -600 mL      General: WN/WD NAD  Neurology: Awake, nonfocal, THRASHER x 4  Eyes: Scleras clear, PERRLA/ EOMI, Gross vision intact  ENT: Gross hearing intact, grossly patent pharynx, no stridor  Neck: Neck supple, trachea midline, No JVD  Respiratory: CTA B/L, No wheezing, rales, rhonchi  CV: RRR, S1S2, no murmurs, rubs or gallops  Abdominal: Soft, NT, ND  Extremities: No edema    Relevant labs, radiology and Medications reviewed                        16.0   7.67  )-----------( 123      ( 19 Oct 2019 06:33 )             46.9             MEDICATIONS  (STANDING):  azithromycin  IVPB      azithromycin  IVPB 500 milliGRAM(s) IV Intermittent every 24 hours  cefTRIAXone Injectable. 1000 milliGRAM(s) IV Push every 24 hours  cefTRIAXone Injectable.      guaiFENesin  milliGRAM(s) Oral every 12 hours  heparin  Injectable 5000 Unit(s) SubCutaneous every 8 hours  influenza   Vaccine 0.5 milliLiter(s) IntraMuscular once  lidocaine   Patch 2 Patch Transdermal daily  nicotine -  14 mG/24Hr(s) Patch 1 patch Transdermal daily  pantoprazole    Tablet 40 milliGRAM(s) Oral before breakfast  sodium chloride 0.9% 1000 milliLiter(s) (125 mL/Hr) IV Continuous <Continuous>    MEDICATIONS  (PRN):  acetaminophen   Tablet .. 650 milliGRAM(s) Oral every 6 hours PRN Temp greater or equal to 38C (100.4F), Mild Pain (1 - 3)  HYDROcodone/homatropine Syrup 5 milliLiter(s) Oral every 6 hours PRN Cough  HYDROmorphone  Injectable 0.5 milliGRAM(s) IV Push every 4 hours PRN Moderate Pain (4 - 6)  HYDROmorphone  Injectable 1 milliGRAM(s) IV Push every 4 hours PRN Severe Pain (7 - 10)  LORazepam   Injectable 2 milliGRAM(s) IV Push every 2 hours PRN CIWA-Ar score increase by 2 points and a total score of 7 or less  ondansetron Injectable 4 milliGRAM(s) IV Push every 6 hours PRN Nausea        Assessment  59y Male  w/ PAST MEDICAL & SURGICAL HISTORY:  Prostate cancer  Scoliosis  No significant past surgical history  admitted with complaints of Patient is a 59y old  Male who presents with a chief complaint of rib fractures (19 Oct 2019 10:13)

## 2019-10-19 NOTE — PROGRESS NOTE ADULT - ASSESSMENT
59y Male admitted 10/16 s/p fall  +ETOH, fall down 5 steps found to have left 1-2 rib fractures with small left pneumothorax, pneumomediastinum.   10/17/19 Pt seen, c/o back discomfort in bed. Currently w temp of 101.8. Denies difficultly breathing/swallowing, chest pain with swallowing.  10/18/19 Pt seen, c/o chest discomfort, right anterior lower chest. Coughing up much sputum, noted to have a fever yesterday. Started on ABX for CAP. Eating without difficulty.  10/19 Pain improving. Afebrile. Tolerating diet. PTX resolved.

## 2019-10-19 NOTE — CONSULT NOTE ADULT - ASSESSMENT
58 yo M with pmh ETOH abuse, prostate CA, scoliosis presents s/p fall down 6 steps sustaining L 1-2 rib fractures, small L pneumothorax and pneumomediastinum. Hospitalist consulted for medical management    #L multiple rib fractures after mechanical fall  #L PTX - post traumatic  #Pneumomediastinum - post traumatic  #ETOH use  - Pain control  - Incentive spirometer  - Continuous pulse ox  - Serial CXR for evolution of PTX  - No e/o withdrawal  - Cont CIWA protocol  - EKG noted, no dynamic changes    #Sepsis/CAP/H.influenza CAP  - POA  - pt had constitutional symptoms prior to arrival  - CXR noted  - Collect blood and sputum cultures: H. influenza. cont ceftriaxone  - RVP neg  - Start rocephin and zithro for suspected GN rods pna  - tyelnol prn  - anti-tussive/mucolytics, add hycodan prn   -serial cxr per surgery   -atelectasis on most recent CXR: Incentive spirometer      DVT px  d/w nursing

## 2019-10-20 PROCEDURE — 99232 SBSQ HOSP IP/OBS MODERATE 35: CPT

## 2019-10-20 RX ADMIN — HEPARIN SODIUM 5000 UNIT(S): 5000 INJECTION INTRAVENOUS; SUBCUTANEOUS at 06:09

## 2019-10-20 RX ADMIN — AZITHROMYCIN 255 MILLIGRAM(S): 500 TABLET, FILM COATED ORAL at 12:14

## 2019-10-20 RX ADMIN — Medication 1 PATCH: at 12:07

## 2019-10-20 RX ADMIN — CEFTRIAXONE 1000 MILLIGRAM(S): 500 INJECTION, POWDER, FOR SOLUTION INTRAMUSCULAR; INTRAVENOUS at 14:03

## 2019-10-20 RX ADMIN — Medication 1 PATCH: at 17:59

## 2019-10-20 RX ADMIN — HEPARIN SODIUM 5000 UNIT(S): 5000 INJECTION INTRAVENOUS; SUBCUTANEOUS at 23:22

## 2019-10-20 RX ADMIN — Medication 1 PATCH: at 06:11

## 2019-10-20 RX ADMIN — Medication 1 PATCH: at 12:15

## 2019-10-20 RX ADMIN — PANTOPRAZOLE SODIUM 40 MILLIGRAM(S): 20 TABLET, DELAYED RELEASE ORAL at 06:09

## 2019-10-20 RX ADMIN — HEPARIN SODIUM 5000 UNIT(S): 5000 INJECTION INTRAVENOUS; SUBCUTANEOUS at 14:03

## 2019-10-20 NOTE — PROGRESS NOTE ADULT - SUBJECTIVE AND OBJECTIVE BOX
Chief Complaint   Patient presents with   • Physical     Noticed after having a BM she will bleed from the urethra     HPI:  Pt is a 44 year old   year old    Doing well.    No LMP recorded. Patient has had an ablation.  ALLERGIES:   Allergen Reactions   • Celery ANAPHYLAXIS     Lip swelling, throat swelling, tongue swelling, hives and sob   • Latex HIVES, SWELLING and SHORTNESS OF BREATH     Lip swelling, hives and sob   • Dog Dander Other (See Comments)     Nasal and eye allergy symptoms.    • Hydrocodone PRURITUS   • Mangos [Crow] SWELLING and PRURITUS     Itchy lips; swollen \"tingly\" tongue   • Pollen Other (See Comments)     Tree pollens, grass pollens, and weed pollens.   Nasal and eye allergy symptoms.    • Sulfa Antibiotics RASH     Outpatient Prescriptions Marked as Taking for the 17 encounter (Office Visit) with Aric Grove MD   Medication Sig Dispense Refill   • montelukast (SINGULAIR) 10 MG tablet Take 1 tablet by mouth nightly. 30 tablet 11   • albuterol 108 (90 BASE) MCG/ACT inhaler Use 1-2 puffs every 4-6 hrs as needed with spacer 1 Inhaler 3   • Spacer/Aero Chamber Mouthpiece Misc Use as instructed. 1 each 0   • Peak Flow Meter Device Use as instructed 1 Device 0   • beclomethasone diprop (QVAR) 80 MCG/ACT inhaler Inhale 2 puffs into the lungs 2 times daily. USE WITH SPACER 1 Inhaler 5     Past Medical History:   Diagnosis Date   • Allergy    • Asthma    • Endometriosis    • Ovarian cyst    • PONV (postoperative nausea and vomiting)     needs meds to prevent   • Sinusitis, chronic      Past Surgical History:   Procedure Laterality Date   • APPENDECTOMY     • ENDOMETRIAL ABLATION  2015    HydroThermal Ablation   • LAPAROSCOPY,ABDM,MASSIMO,OMENT,DX  2015    Laparoscopy    • OVARIAN CYST REMOVAL     • REMOVAL OF OVARY(S)  2008    left    • SINUS SURGERY      ESS     Social History     Social History   • Marital status: Single     Spouse name: N/A   • Number of children:  CC:Patient is a 59y old  Male who presents with a chief complaint of rib fractures (19 Oct 2019 13:43)      Subjective:  Pt seen and examined at bedside with chaperone. Pt is AAOx3, pt in no acute distress. Pt states c/o left rib pain from known fracture pathology. Pt denied c/o fever, chills, SOB, abd pain, N/V/D, extremity pain or dysfunction, hemoptysis, hematemesis, hematuria, hematochexia, headache, diplopia, vertigo, dizzyness. Pt tolerating diet, (+) void, (+) oob, (+) bowel function    ROS:  rib pain, otherwise as abovementioned ROS    Vital Signs Last 24 Hrs  T(C): 36.6 (20 Oct 2019 09:30), Max: 36.8 (20 Oct 2019 06:45)  T(F): 97.9 (20 Oct 2019 09:30), Max: 98.3 (20 Oct 2019 06:45)  HR: 74 (20 Oct 2019 11:00) (62 - 84)  BP: 123/78 (20 Oct 2019 11:00) (118/72 - 157/72)  BP(mean): 87 (20 Oct 2019 11:00) (81 - 97)  RR: 18 (20 Oct 2019 11:00) (15 - 24)  SpO2: 96% (20 Oct 2019 11:00) (93% - 100%)    Labs:                                16.0   7.67  )-----------( 123      ( 19 Oct 2019 06:33 )             46.9     CBC Full  -  ( 19 Oct 2019 06:33 )  WBC Count : 7.67 K/uL  RBC Count : 4.70 M/uL  Hemoglobin : 16.0 g/dL  Hematocrit : 46.9 %  Platelet Count - Automated : 123 K/uL  Mean Cell Volume : 99.8 fl  Mean Cell Hemoglobin : 34.0 pg  Mean Cell Hemoglobin Concentration : 34.1 gm/dL  Auto Neutrophil # : x  Auto Lymphocyte # : x  Auto Monocyte # : x  Auto Eosinophil # : x  Auto Basophil # : x  Auto Neutrophil % : x  Auto Lymphocyte % : x  Auto Monocyte % : x  Auto Eosinophil % : x  Auto Basophil % : x                  Meds:  acetaminophen   Tablet .. 650 milliGRAM(s) Oral every 6 hours PRN  azithromycin  IVPB      azithromycin  IVPB 500 milliGRAM(s) IV Intermittent every 24 hours  cefTRIAXone Injectable. 1000 milliGRAM(s) IV Push every 24 hours  cefTRIAXone Injectable.      guaiFENesin  milliGRAM(s) Oral every 12 hours  heparin  Injectable 5000 Unit(s) SubCutaneous every 8 hours  HYDROcodone/homatropine Syrup 5 milliLiter(s) Oral every 6 hours PRN  HYDROmorphone  Injectable 0.5 milliGRAM(s) IV Push every 4 hours PRN  HYDROmorphone  Injectable 1 milliGRAM(s) IV Push every 4 hours PRN  influenza   Vaccine 0.5 milliLiter(s) IntraMuscular once  lidocaine   Patch 2 Patch Transdermal daily  LORazepam   Injectable 2 milliGRAM(s) IV Push every 2 hours PRN  nicotine -  14 mG/24Hr(s) Patch 1 patch Transdermal daily  ondansetron Injectable 4 milliGRAM(s) IV Push every 6 hours PRN  pantoprazole    Tablet 40 milliGRAM(s) Oral before breakfast  sodium chloride 0.9% 1000 milliLiter(s) IV Continuous <Continuous>      Radiology:  < from: Xray Chest 1 View- PORTABLE-Routine (10.19.19 @ 10:10) >  EXAM:  XR CHEST PORTABLE ROUTINE 1V                            PROCEDURE DATE:  10/19/2019          INTERPRETATION:  History: Subcutaneous air.    Single view of the chest was performed.    Comparison is made to March 18, 2019.    Findings:    There is diffuse cutaneous emphysema overlying the left chest wall. This   limits evaluation for underlying pneumothorax, however no definite   pneumothorax is noted. There is opacity in the left lower lung field   consistent with a combination of partialatelectasis, effusion, and/or   pneumonia. The right lung is clear. The heart size is normal.    Impression:    Redemonstration of subcutaneous emphysema overlying the left chest wall.   This its evaluation for pneumothorax, however no definite pneumothorax is   noted. There is opacity in the left lower lung field consistent with a   combination of partial atelectasis, effusion, and/or pneumonia, grossly   unchanged                VALERY PETTY M.D., ATTENDING RADIOLOGIST  This document has beenelectronically signed. Oct 19 2019 10:30AM          < end of copied text >      Physical exam:  GCS of 15  Pt is aaox3  Pt in no acute distress  Airway is patent  Breathing is symmetric and unlabored  Neuro: CN II-XII grossly intact  Psych: normal affect  HEENT: normocephalic, RODGER, EOM wnl, no gross craniofacial bony pathology to exam  Neck: No tracheal deviation, no JVD, (+) crepitus, no ecchymosis, no hematoma  Chest: No gross right rib or sternal pathology or tenderness to exam, (+) tenderness to left anterior and posterior chest wall, (+) crepitus to left chest wall, no ecchymosis, no hematoma  Resp: (+) rhonchi, incentive spirometry use encouraged  CVS: S1S2(+)  ABD: bowel sounds (+), soft, nontender, non distended, no rebound, no guarding, no rigidity, no pelvic instability to exam  EXT: no calf tenderness or edema to exam b/l, pt has good capillary refill in all digits. Sensoromotor function grossly intact, on VTE prophylaxis  Skin: no adverse skin changes to exam N/A   • Years of education: N/A     Occupational History   • DENTAL ASSISTANT      Social History Main Topics   • Smoking status: Never Smoker   • Smokeless tobacco: Never Used   • Alcohol use 0.6 oz/week     1 Standard drinks or equivalent per week      Comment: occasional   • Drug use: No   • Sexual activity: Yes     Partners: Male     Birth control/ protection: Surgical      Comment: Ablation     Other Topics Concern   •  Service No   • Blood Transfusions No   • Caffeine Concern No     2 COFFEES DAILY   • Occupational Exposure No   • Hobby Hazards No   • Sleep Concern No   • Stress Concern No   • Weight Concern No   • Special Diet Yes     LOW CARB   • Back Care No   • Exercise Yes     WALKING   • Bike Helmet No   • Seat Belt Yes   • Self-Exams No     Social History Narrative   • No narrative on file     Family History   Problem Relation Age of Onset   • Heart Mother    • Hypertension Mother    • Lipids Mother    • Heart Maternal Grandmother    • Heart Maternal Grandfather    • Asthma Paternal Grandfather    • Cancer Brother      Hodgkin's Lymphoma     Obstetric History       T0      L0     SAB0   TAB0   Ectopic0   Molar0   Multiple0   Live Births0            PHYSICAL EXAM  Visit Vitals  /78 ([place] [facility] [place]: Sitting, Cuff Size: Regular)   Ht 5' 4\" (1.626 m)   Wt 75.7 kg   BMI 28.65 kg/m²     GENERAL APEARANCE:  The patient is a pleasant, normal appearing female with normal affect and in no distress.  NECK: supple.  No cervical lymphadenopathy.  No thyromegaly, no nodules palpated, trachea midline.  LUNGS: Clear bilaterally with normal respiratory effort  HEART:   Regular rate and rhythm.  No murmurs noted.    BREASTS  Breast exam performed seated and supine.  No masses, non-tender, no nipple discharge or lymphadenopathy.  ABDOMEN: Soft, non-tender, non-distended.  No hepatosplenomegaly.  Normal bowel sounds.  No umbilical or inguinal hernias.  SKIN:  Warm and dry to  touch.  No lesions or rashes noted.    PSYCHIATRIC/NEUROLOGIC:  Appropriate mood and affect, normal recall, alert and oriented x 3  EXTREMITIES:  Warm and well perfused.  No edema noted. Patient ambulating and moving normally.  :  Vulva:  Inspection of her external genitalia reveals normal mons pubis, labia minora and labia majora.  Normal appearing clitoris, urethral meatus and Creola's glands.    Bladder:   No evidence of urethral or bladder tenderness.    Vagina:   Speculum exam reveals pink and moist vaginal mucosa.  Bartholin gland is normal to palpation.  Cervix:   Cervix is normal in appearance with no lesions.  There is no cervical motion tenderness.  Uterus:  Uterus is normal size, mobile and non-tender.    Adnexa: No adnexal masses are palpated and non-tender to palpation  Perineum:  Perineum appears normal.  Anus:  Normal with no apparent lesions. Guaiac is negative.    Assessment and Plan:  This office note has been dictated.      Orders and visit diagnoses:  Pretty was seen today for physical.    Diagnoses and all orders for this visit:    Well woman exam with routine gynecological exam

## 2019-10-20 NOTE — CONSULT NOTE ADULT - ASSESSMENT
58 yo M with pmh ETOH abuse, prostate CA, scoliosis presents s/p fall down 6 steps sustaining L 1-2 rib fractures, small L pneumothorax and pneumomediastinum. Hospitalist consulted for medical management    #L multiple rib fractures after mechanical fall  #L PTX - post traumatic  #Pneumomediastinum - post traumatic  #ETOH use  - Pain control  - Incentive spirometer  - Continuous pulse ox  - Serial CXR for evolution of PTX  - No e/o withdrawal  - Stop CIWA protocol  - EKG noted, no dynamic changes    #Sepsis/CAP/H.influenza CAP  - POA  - pt had constitutional symptoms prior to arrival  - CXR noted  - Collect blood and sputum cultures: H. influenza. cont ceftriaxone  - May switch to po keflex 500 bid to total 10 days upon dc. D/W primary team  - RVP neg  - tyelnol prn  - anti-tussive/mucolytics, add hycodan prn  -atelectasis on most recent CXR: Incentive spirometer      DVT px  d/w nursing  Dispo: d/c planning when cleared from CTS standpoint

## 2019-10-20 NOTE — CONSULT NOTE ADULT - NSHPATTENDINGPLANDISCUSS_GEN_ALL_CORE
patient and nursing
Patient and significant other

## 2019-10-20 NOTE — CONSULT NOTE ADULT - SUBJECTIVE AND OBJECTIVE BOX
58 yo M with pmh ETOH abuse, prostate CA, scoliosis presents s/p fall down 6 steps sustaining L 1-2 rib fractures, small L pneumothorax and pneumomediastinum.   Hospitalist consulted for medical management.   Pt appears uncomfortable during exam endorsing pain to left anterior chest wall worse on inspiration. No abd pain. No neck pain.   No hematochezia or melena. No hematuria.     Sub: afebrile for 48 hours o/n with very productive sputum. Sputum: + H. influenza      Past Medical, Past Surgical, and Family History:  PAST MEDICAL HISTORY:  Prostate cancer     Scoliosis.     Shx: left foot surgery after complete avulsion      fhx: mother early CAD,  at 92; Father: copd     Social History:  Social History (marital status, living situation, occupation, tobacco use, alcohol and drug use, and sexual history): 1 ppd tobacco x 30 years, pt states 3 cocktails of vodka weekly, denied illicit drug use	     Tobacco Screening:  · Core Measure Site	Yes	  · Has the patient used tobacco in the past 30 days?	Yes	  · Tobacco Cessation Education/Counseling	Offered and patient declined	  · Tobacco Cessation Medication	Offered and patient accepted	            REVIEW OF SYSTEMS:    CONSTITUTIONAL: No weakness, fevers or chills  EYES/ENT: No visual changes;  No vertigo or throat pain   NECK: No pain or stiffness  RESPIRATORY: No cough, wheezing, hemoptysis; No shortness of breath  CARDIOVASCULAR: No chest pain or palpitations. + left chest wall pain  GASTROINTESTINAL: No abdominal or epigastric pain. No nausea, vomiting, or hematemesis; No diarrhea or constipation. No melena or hematochezia.  GENITOURINARY: No dysuria, frequency or hematuria  NEUROLOGICAL: No numbness or weakness  SKIN: No itching, burning, rashes, or lesions   All other review of systems is negative unless indicated above    ICU Vital Signs Last 24 Hrs  T(C): 36.6 (20 Oct 2019 09:30), Max: 36.8 (20 Oct 2019 06:45)  T(F): 97.9 (20 Oct 2019 09:30), Max: 98.3 (20 Oct 2019 06:45)  HR: 77 (20 Oct 2019 13:00) (62 - 81)  BP: 96/85 (20 Oct 2019 13:00) (96/85 - 157/72)  BP(mean): 88 (20 Oct 2019 13:00) (81 - 97)  ABP: --  ABP(mean): --  RR: 23 (20 Oct 2019 13:00) (15 - 24)  SpO2: 96% (20 Oct 2019 13:00) (93% - 100%)                PHYSICAL EXAM:    Constitutional: NAD, awake and alert, well-developed  HEENT: PERR, EOMI, Normal Hearing, MMM  Neck: Soft and supple, No LAD, No JVD  Respiratory: bronchial breath sounds to left lung base; poor inspiratory effocrt  Cardiovascular: S1 and S2, regular rate and rhythm, no Murmurs, gallops or rubs  Gastrointestinal: Bowel Sounds present, soft, nontender, nondistended, no guarding, no rebound  Extremities: No peripheral edema  Vascular: 2+ peripheral pulses  Neurological: A/O x 3, no focal deficits  Musculoskeletal: 5/5 strength b/l upper and lower extremities  Skin: No rashes

## 2019-10-20 NOTE — PROGRESS NOTE ADULT - ASSESSMENT
A/P:  Left 1-2nd rib fractures  Large left subcutaneous emphysema  Pneumomediastinum  S/P fall down 6 steps 10/15/19  Thoracic surgery on consult, management and further workup per thoracic surgery  CIWA protocol  GI/DVT prophylaxis  Pain control  Incentive spirometry  Hospitalist on consult for medical management   consult for etoh, post d/c needs  Physical therapy  Cont current care and meds  Pt will be monitored for signs of evolution/resolution of pathology and surgical intervention as required and warranted  Pt aware of and agrees with all of the above

## 2019-10-21 ENCOUNTER — TRANSCRIPTION ENCOUNTER (OUTPATIENT)
Age: 59
End: 2019-10-21

## 2019-10-21 VITALS
DIASTOLIC BLOOD PRESSURE: 97 MMHG | OXYGEN SATURATION: 94 % | SYSTOLIC BLOOD PRESSURE: 139 MMHG | HEART RATE: 74 BPM | RESPIRATION RATE: 22 BRPM

## 2019-10-21 PROCEDURE — 99239 HOSP IP/OBS DSCHRG MGMT >30: CPT

## 2019-10-21 PROCEDURE — 71250 CT THORAX DX C-: CPT | Mod: 26

## 2019-10-21 PROCEDURE — 99233 SBSQ HOSP IP/OBS HIGH 50: CPT

## 2019-10-21 RX ORDER — IBUPROFEN 200 MG
400 TABLET ORAL
Qty: 0 | Refills: 0 | DISCHARGE

## 2019-10-21 RX ORDER — ACETAMINOPHEN 500 MG
2 TABLET ORAL
Qty: 0 | Refills: 0 | DISCHARGE
Start: 2019-10-21

## 2019-10-21 RX ORDER — OXYCODONE HYDROCHLORIDE 5 MG/1
1 TABLET ORAL
Qty: 0 | Refills: 0 | DISCHARGE

## 2019-10-21 RX ORDER — OXYCODONE HYDROCHLORIDE 5 MG/1
1 TABLET ORAL
Qty: 28 | Refills: 0
Start: 2019-10-21 | End: 2019-10-27

## 2019-10-21 RX ADMIN — Medication 1 PATCH: at 13:06

## 2019-10-21 RX ADMIN — PANTOPRAZOLE SODIUM 40 MILLIGRAM(S): 20 TABLET, DELAYED RELEASE ORAL at 06:26

## 2019-10-21 RX ADMIN — HEPARIN SODIUM 5000 UNIT(S): 5000 INJECTION INTRAVENOUS; SUBCUTANEOUS at 06:26

## 2019-10-21 RX ADMIN — Medication 1 TABLET(S): at 13:06

## 2019-10-21 NOTE — PROGRESS NOTE ADULT - SUBJECTIVE AND OBJECTIVE BOX
Subjective:  Patient is feeling well, still has cough.  Pain is controlled.  CT scan repeated, pneumomediastinum is resolving.    Vital Signs:  Vital Signs Last 24 Hrs  T(F): 98.3   HR: 69  BP: 123/72  RR: 24   SpO2: 94% on room air    Telemetry/Alarms: sinus rhythm    Relevant labs, radiology and Medications reviewed      MEDICATIONS  (STANDING):  amoxicillin  875 milliGRAM(s)/clavulanate 1 Tablet(s) Oral every 12 hours  guaiFENesin  milliGRAM(s) Oral every 12 hours  heparin  Injectable 5000 Unit(s) SubCutaneous every 8 hours  influenza   Vaccine 0.5 milliLiter(s) IntraMuscular once  lidocaine   Patch 2 Patch Transdermal daily  nicotine -  14 mG/24Hr(s) Patch 1 patch Transdermal daily  pantoprazole    Tablet 40 milliGRAM(s) Oral before breakfast  sodium chloride 0.9% 1000 milliLiter(s) (125 mL/Hr) IV Continuous <Continuous>    MEDICATIONS  (PRN):  acetaminophen   Tablet .. 650 milliGRAM(s) Oral every 6 hours PRN Temp greater or equal to 38C (100.4F), Mild Pain (1 - 3)  HYDROcodone/homatropine Syrup 5 milliLiter(s) Oral every 6 hours PRN Cough  HYDROmorphone  Injectable 0.5 milliGRAM(s) IV Push every 4 hours PRN Moderate Pain (4 - 6)  HYDROmorphone  Injectable 1 milliGRAM(s) IV Push every 4 hours PRN Severe Pain (7 - 10)  ondansetron Injectable 4 milliGRAM(s) IV Push every 6 hours PRN Nausea      Physical exam  Gen: NAD breathing comfortably  Neuro: AO x 3  Card: S1 S2  Pulm: CTA  Abd: soft NT ND  Ext: No edema    I&O's Summary    20 Oct 2019 07:01  -  21 Oct 2019 07:00  --------------------------------------------------------  IN: 250 mL / OUT: 300 mL / NET: -50 mL        Assessment  59y Male  w/ PAST MEDICAL & SURGICAL HISTORY:  Prostate cancer  Scoliosis  No significant past surgical history  admitted s/p fall with left 1-2 rib fractures, pneumomediastinum, and left lower lobe pneumonia (21 Oct 2019 13:23)      PLAN    No acute thoracic surgical intervention planned.  He should have close outpatient follow up for his left lower lobe atelectasis and pneumonia with his primary care provider or pulmonologist.  No heavy lifting and no overhead movement of his left upper extremity.  Please reconsult as needed #[id number]    Discussed with Cardiothoracic Team at AM rounds.

## 2019-10-21 NOTE — DISCHARGE NOTE PROVIDER - HOSPITAL COURSE
Pt with right sided rib fractures, pneumothorax, pneumomediastinum, clinically improved, fever better, being treated for pneumonia, new CT with LLL atelectasis VS Pneumonia, on anabiotics, cleared by CTS and medical service, wants to go home.

## 2019-10-21 NOTE — PROGRESS NOTE ADULT - PROVIDER SPECIALTY LIST ADULT
Hospitalist
Thoracic Surgery
Trauma Surgery

## 2019-10-21 NOTE — DISCHARGE NOTE PROVIDER - NSDCFUADDINST_GEN_ALL_CORE_FT
Seek medical attention of develops worsening headache, nausea, vomiting, seizure, any focal neurological complaint, Pain not controlled with medication, difficulty breathing or fever. No Heavy lifting, pushing, pulling or excessive physical activity for 2 weeks. Incentive spirometry. finish antibiotics, seek medical attention if develops fever, SOB, cough. Follow up with PMD, CT surgery .Fall precautions. call offices for follow up appointments. Seek medical attention of develops worsening headache, nausea, vomiting, seizure, any focal neurological complaint, Pain not controlled with medication, difficulty breathing or fever. No Heavy lifting, pushing, pulling or excessive physical activity for 2 weeks. Incentive spirometry. finish antibiotics 1 weeks, seek medical attention if develops fever, SOB, cough. Follow up with PMD, Dr. Joseph of pulmonology CT surgery. Fall precautions. call offices for follow up appointments.

## 2019-10-21 NOTE — DISCHARGE NOTE NURSING/CASE MANAGEMENT/SOCIAL WORK - PATIENT PORTAL LINK FT
You can access the FollowMyHealth Patient Portal offered by [facility] by registering at the following website: [email or web address]. By joining [facility]’s FollowMyHealth portal, you will also be able to view your health information using other applications (apps) compatible with our system.

## 2019-10-21 NOTE — DISCHARGE NOTE PROVIDER - NSDCCPCAREPLAN_GEN_ALL_CORE_FT
PRINCIPAL DISCHARGE DIAGNOSIS  Diagnosis: Closed fracture of multiple ribs of left side, initial encounter  Assessment and Plan of Treatment: pain contorl      SECONDARY DISCHARGE DIAGNOSES  Diagnosis: Pneumonia  Assessment and Plan of Treatment: anitbitoics followu with PMD    Diagnosis: Subcutaneous air, initial encounter  Assessment and Plan of Treatment:     Diagnosis: Pneumomediastinum  Assessment and Plan of Treatment: terence gomez with Pulmonology, CTS

## 2019-10-21 NOTE — PROGRESS NOTE ADULT - ASSESSMENT
60 yo M with pmh ETOH abuse, prostate CA, scoliosis presents s/p fall down 6 steps sustaining L 1-2 rib fractures, small L pneumothorax and pneumomediastinum. Hospitalist consulted for medical management    #L multiple rib fractures after mechanical fall  #L PTX - post traumatic  #Pneumomediastinum - post traumatic  #ETOH use  - Pain control  - Incentive spirometer  - Continuous pulse ox  - Serial CXR for evolution of PTX  - No e/o withdrawal, Stop CIWA protocol  - EKG noted, no dynamic changes    #Sepsis/CAP/H.influenza CAP  - pt had constitutional symptoms prior to arrival  - CXR noted  - Collect blood and sputum cultures: H. influenza. got ceftriaxone  - May switch to PO AUGMENTIN FOR THREE MORE DAYS, discussed with patient to f/u with his PMD in a week and get follow-up CXR with CT Sx  - RVP neg  - tyelnol prn  - anti-tussive/mucolytics, add hycodan prn  -atelectasis on most recent CXR:  he's using the Incentive spirometer      DVT px  d/w RN  Dispo: d/c planning when cleared from CTS standpoint

## 2019-10-21 NOTE — PROGRESS NOTE ADULT - SUBJECTIVE AND OBJECTIVE BOX
60 yo M with pmh ETOH abuse, prostate CA, scoliosis presents s/p fall down 6 steps sustaining L 1-2 rib fractures, small L pneumothorax and pneumomediastinum.   Hospitalist consulted for medical management.   Pt appears uncomfortable during exam endorsing pain to left anterior chest wall worse on inspiration. No abd pain. No neck pain.   No hematochezia or melena. No hematuria.     Sub: afebrile for 48 hours, sputum has decreased, yellowish   feeling better, has been wallking  Sputum: + H. influenza    PHYSICAL EXAM:  GENERAL: NAD, able to lie flat in bed  HEAD:  Atraumatic, Normocephalic  EYES: EOMI, PERRLA, normal sclera  ENT: Moist mucous membranes  NECK: Supple, No JVD, no nuchal rigidity  CHEST/LUNG: Clear to auscultation bilaterally; No rales, rhonchi, wheezing, or rubs. Unlabored respirations  HEART: Regular rate and rhythm; No murmurs, rubs, or gallops  ABDOMEN: Bowel sounds present; Soft, Nontender, Nondistended. No hepatomegaly  EXTREMITIES:  no pitting bilaterally  NERVOUS SYSTEM:  Alert & Oriented X3, speech clear. No focal motor or sensory deficits  MSK: FROM all 4 extremities, full and equal strength  SKIN: No rashes or lesions    LABS: All Labs Reviewed:  WBC 7.67    RADIOLOGY/EKG:  < from: CT Chest No Cont (10.21.19 @ 10:48) >  CT chest reviewed by me: LLL atelectasis, left sided s/c emphysema  Radiology report as follows:  LUNGS, AIRWAYS: The central airways are patent. Left Lower lobe near   complete atelectasis, otherwise the lungs are clear.  PLEURA: Trace right and small left effusion. No pneumothorax.  MEDIASTINUM AND CRISTY: No adenopathy or hematoma. Decreased   pneumomediastinum.  BONES AND SOFT TISSUES: Fractures of the left anterior first and second   ribs again noted. Left chest wall emphysema again seen.    acetaminophen   Tablet .. 650 milliGRAM(s) Oral every 6 hours PRN  amoxicillin  875 milliGRAM(s)/clavulanate 1 Tablet(s) Oral every 12 hours  guaiFENesin  milliGRAM(s) Oral every 12 hours  heparin  Injectable 5000 Unit(s) SubCutaneous every 8 hours  HYDROcodone/homatropine Syrup 5 milliLiter(s) Oral every 6 hours PRN  HYDROmorphone  Injectable 0.5 milliGRAM(s) IV Push every 4 hours PRN  HYDROmorphone  Injectable 1 milliGRAM(s) IV Push every 4 hours PRN  influenza   Vaccine 0.5 milliLiter(s) IntraMuscular once  lidocaine   Patch 2 Patch Transdermal daily  nicotine -  14 mG/24Hr(s) Patch 1 patch Transdermal daily  ondansetron Injectable 4 milliGRAM(s) IV Push every 6 hours PRN  pantoprazole    Tablet 40 milliGRAM(s) Oral before breakfast  sodium chloride 0.9% 1000 milliLiter(s) IV Continuous <Continuous>

## 2019-10-21 NOTE — DISCHARGE NOTE PROVIDER - NSDCACTIVITY_GEN_ALL_CORE
No heavy lifting/straining/Walking - Indoors allowed/Stairs allowed/Walking - Outdoors allowed/Do not drive or operate machinery

## 2019-10-21 NOTE — DISCHARGE NOTE PROVIDER - CARE PROVIDER_API CALL
yonis,   Phone: (   )    -  Fax: (   )    -  Follow Up Time:     Shyann Duffy)  Thoracic and Cardiac Surgery  [address] [place] [address]  Phone: [phone]  Fax: [phone]  Follow Up Time:     PMD,   Phone: (   )    -  Fax: (   )    -  Follow Up Time: Shyann Duffy)  Thoracic and Cardiac Surgery  [address] [place] [address]  Phone: [phone]  Fax: [phone]  Follow Up Time: 1 month    PMD,   Phone: (   )    -  Fax: (   )    -  Follow Up Time: 1 week    Randy Joseph)  Critical Care Medicine; Internal Medicine; Pulmonary Disease; Sleep Medicine  [address] [place] [address]  Phone: [phone]  Fax: [phone]  Follow Up Time: 1 week

## 2019-10-21 NOTE — PROGRESS NOTE ADULT - REASON FOR ADMISSION
rib fractures

## 2019-10-22 LAB
CULTURE RESULTS: SIGNIFICANT CHANGE UP
CULTURE RESULTS: SIGNIFICANT CHANGE UP
SPECIMEN SOURCE: SIGNIFICANT CHANGE UP
SPECIMEN SOURCE: SIGNIFICANT CHANGE UP

## 2019-10-24 DIAGNOSIS — F17.210 NICOTINE DEPENDENCE, CIGARETTES, UNCOMPLICATED: ICD-10-CM
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
## 2019-10-24 DIAGNOSIS — F10.10 ALCOHOL ABUSE, UNCOMPLICATED: ICD-10-CM

## 2019-10-24 DIAGNOSIS — A41.9 SEPSIS, UNSPECIFIED ORGANISM: ICD-10-CM
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
## 2019-10-24 DIAGNOSIS — T79.7XXA TRAUMATIC SUBCUTANEOUS EMPHYSEMA, INITIAL ENCOUNTER: ICD-10-CM
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
## 2019-10-24 DIAGNOSIS — S22.42XA MULTIPLE FRACTURES OF RIBS, LEFT SIDE, INITIAL ENCOUNTER FOR CLOSED FRACTURE: ICD-10-CM

## 2019-10-24 DIAGNOSIS — S27.0XXA TRAUMATIC PNEUMOTHORAX, INITIAL ENCOUNTER: ICD-10-CM

## 2019-10-24 DIAGNOSIS — J14 PNEUMONIA DUE TO HEMOPHILUS INFLUENZAE: ICD-10-CM

## 2021-12-28 ENCOUNTER — TRANSCRIPTION ENCOUNTER (OUTPATIENT)
Age: 61
End: 2021-12-28

## 2023-03-06 PROBLEM — M41.9 SCOLIOSIS, UNSPECIFIED: Chronic | Status: ACTIVE | Noted: 2019-10-16

## 2023-03-06 PROBLEM — C61 MALIGNANT NEOPLASM OF PROSTATE: Chronic | Status: ACTIVE | Noted: 2019-10-16

## 2023-03-29 ENCOUNTER — APPOINTMENT (OUTPATIENT)
Dept: UROLOGY | Facility: CLINIC | Age: 63
End: 2023-03-29
Payer: COMMERCIAL

## 2023-03-29 VITALS
BODY MASS INDEX: 33.6 KG/M2 | WEIGHT: 240 LBS | RESPIRATION RATE: 17 BRPM | HEIGHT: 71 IN | DIASTOLIC BLOOD PRESSURE: 79 MMHG | SYSTOLIC BLOOD PRESSURE: 151 MMHG | HEART RATE: 97 BPM

## 2023-03-29 PROCEDURE — 99203 OFFICE O/P NEW LOW 30 MIN: CPT

## 2023-03-30 LAB
PSA FREE FLD-MCNC: 5 %
PSA FREE SERPL-MCNC: 0.12 NG/ML
PSA SERPL-MCNC: 2.43 NG/ML

## 2023-04-01 NOTE — HISTORY OF PRESENT ILLNESS
[FreeTextEntry1] : transferring care to [facility] - patient of [facility]\par h/p prostate cancer teated isabel-gland cryotherapy 2019.\par doesn't recall PSA or Leticia score. hasn't had a PSA since 2021.\par Has nocturia twice but no other bothersome LUTs. No hematuria or dysuria. \par

## 2023-10-17 ENCOUNTER — LABORATORY RESULT (OUTPATIENT)
Age: 63
End: 2023-10-17

## 2023-10-18 ENCOUNTER — APPOINTMENT (OUTPATIENT)
Dept: UROLOGY | Facility: CLINIC | Age: 63
End: 2023-10-18
Payer: COMMERCIAL

## 2023-10-18 PROCEDURE — 99212 OFFICE O/P EST SF 10 MIN: CPT | Mod: 95

## 2024-04-05 LAB — PSA SERPL-MCNC: 2.58 NG/ML

## 2024-04-17 ENCOUNTER — APPOINTMENT (OUTPATIENT)
Dept: UROLOGY | Facility: CLINIC | Age: 64
End: 2024-04-17
Payer: COMMERCIAL

## 2024-04-17 DIAGNOSIS — C61 MALIGNANT NEOPLASM OF PROSTATE: ICD-10-CM

## 2024-04-17 PROCEDURE — 99212 OFFICE O/P EST SF 10 MIN: CPT

## 2024-04-17 PROCEDURE — G2211 COMPLEX E/M VISIT ADD ON: CPT

## 2024-04-17 NOTE — HISTORY OF PRESENT ILLNESS
[FreeTextEntry1] : transferring care to [facility] - patient of [facility] h/p prostate cancer treated isabel-gland cryotherapy 2019. doesn't recall PSA or Leticia score. hasn't had a PSA since 2021. Has nocturia twice but no other bothersome LUTs. No hematuria or dysuria.   10/23 6 month f/u Has nocturia 2 times but no hematuria or dysuria. PSA 2.3   4/24 opted for TEB no new issues  - voids as above, nothing bothersome. PSA 2.58

## 2025-01-15 ENCOUNTER — APPOINTMENT (OUTPATIENT)
Dept: UROLOGY | Facility: CLINIC | Age: 65
End: 2025-01-15
Payer: MEDICARE

## 2025-01-15 DIAGNOSIS — C61 MALIGNANT NEOPLASM OF PROSTATE: ICD-10-CM

## 2025-01-15 PROCEDURE — 99202 OFFICE O/P NEW SF 15 MIN: CPT

## 2025-01-15 PROCEDURE — G2211 COMPLEX E/M VISIT ADD ON: CPT

## 2025-08-19 ENCOUNTER — NON-APPOINTMENT (OUTPATIENT)
Age: 65
End: 2025-08-19

## 2025-08-20 ENCOUNTER — APPOINTMENT (OUTPATIENT)
Dept: UROLOGY | Facility: CLINIC | Age: 65
End: 2025-08-20
Payer: MEDICARE

## 2025-08-20 DIAGNOSIS — C61 MALIGNANT NEOPLASM OF PROSTATE: ICD-10-CM

## 2025-08-20 PROCEDURE — 99212 OFFICE O/P EST SF 10 MIN: CPT | Mod: 2W

## 2025-08-20 PROCEDURE — G2211 COMPLEX E/M VISIT ADD ON: CPT | Mod: 2W
